# Patient Record
Sex: FEMALE | Race: BLACK OR AFRICAN AMERICAN | NOT HISPANIC OR LATINO | URBAN - METROPOLITAN AREA
[De-identification: names, ages, dates, MRNs, and addresses within clinical notes are randomized per-mention and may not be internally consistent; named-entity substitution may affect disease eponyms.]

---

## 2018-11-15 ENCOUNTER — EMERGENCY (EMERGENCY)
Facility: HOSPITAL | Age: 48
LOS: 1 days | Discharge: HOME | End: 2018-11-15
Attending: EMERGENCY MEDICINE | Admitting: EMERGENCY MEDICINE
Payer: OTHER MISCELLANEOUS

## 2018-11-15 VITALS
TEMPERATURE: 99 F | DIASTOLIC BLOOD PRESSURE: 88 MMHG | OXYGEN SATURATION: 98 % | RESPIRATION RATE: 17 BRPM | SYSTOLIC BLOOD PRESSURE: 155 MMHG | HEART RATE: 82 BPM

## 2018-11-15 PROCEDURE — 99284 EMERGENCY DEPT VISIT MOD MDM: CPT

## 2018-11-15 PROCEDURE — 72125 CT NECK SPINE W/O DYE: CPT | Mod: 26

## 2018-11-15 PROCEDURE — 70450 CT HEAD/BRAIN W/O DYE: CPT | Mod: 26

## 2018-11-15 RX ORDER — IBUPROFEN 200 MG
600 TABLET ORAL ONCE
Qty: 0 | Refills: 0 | Status: COMPLETED | OUTPATIENT
Start: 2018-11-15 | End: 2018-11-15

## 2018-11-15 RX ADMIN — Medication 600 MILLIGRAM(S): at 09:51

## 2018-11-15 NOTE — ED PROVIDER NOTE - CHPI ED SYMPTOMS NEG
no change in level of consciousness/no chest wall tenderness/no vomiting/no seizure/no weakness/no back pain/no loss of consciousness/no chest pain/no blurred vision

## 2018-11-15 NOTE — ED PROVIDER NOTE - SKIN, MLM
Skin normal color for race, warm, dry and intact. No evidence of rash. small abrasion to bridge of nose
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
## 2020-04-26 ENCOUNTER — MESSAGE (OUTPATIENT)
Age: 50
End: 2020-04-26

## 2020-05-04 LAB
SARS-COV-2 IGG SERPL IA-ACNC: <0.1 INDEX
SARS-COV-2 IGG SERPL QL IA: NEGATIVE

## 2020-05-08 ENCOUNTER — APPOINTMENT (OUTPATIENT)
Dept: DISASTER EMERGENCY | Facility: HOSPITAL | Age: 50
End: 2020-05-08

## 2021-12-09 PROBLEM — Z00.00 ENCOUNTER FOR PREVENTIVE HEALTH EXAMINATION: Status: ACTIVE | Noted: 2021-12-09

## 2021-12-28 ENCOUNTER — TRANSCRIPTION ENCOUNTER (OUTPATIENT)
Age: 51
End: 2021-12-28

## 2021-12-29 ENCOUNTER — APPOINTMENT (OUTPATIENT)
Dept: RADIOLOGY | Facility: IMAGING CENTER | Age: 51
End: 2021-12-29
Payer: COMMERCIAL

## 2021-12-29 ENCOUNTER — OUTPATIENT (OUTPATIENT)
Dept: OUTPATIENT SERVICES | Facility: HOSPITAL | Age: 51
LOS: 1 days | End: 2021-12-29
Payer: COMMERCIAL

## 2021-12-29 DIAGNOSIS — Z00.8 ENCOUNTER FOR OTHER GENERAL EXAMINATION: ICD-10-CM

## 2021-12-29 PROCEDURE — 77080 DXA BONE DENSITY AXIAL: CPT | Mod: 26

## 2021-12-29 PROCEDURE — 77080 DXA BONE DENSITY AXIAL: CPT

## 2022-12-06 ENCOUNTER — INPATIENT (INPATIENT)
Facility: HOSPITAL | Age: 52
LOS: 1 days | Discharge: HOME | End: 2022-12-08
Attending: INTERNAL MEDICINE | Admitting: INTERNAL MEDICINE

## 2022-12-06 VITALS
RESPIRATION RATE: 18 BRPM | SYSTOLIC BLOOD PRESSURE: 145 MMHG | DIASTOLIC BLOOD PRESSURE: 97 MMHG | OXYGEN SATURATION: 99 % | HEART RATE: 145 BPM | TEMPERATURE: 103 F

## 2022-12-06 DIAGNOSIS — J18.9 PNEUMONIA, UNSPECIFIED ORGANISM: ICD-10-CM

## 2022-12-06 DIAGNOSIS — R94.31 ABNORMAL ELECTROCARDIOGRAM [ECG] [EKG]: ICD-10-CM

## 2022-12-06 DIAGNOSIS — Z29.9 ENCOUNTER FOR PROPHYLACTIC MEASURES, UNSPECIFIED: ICD-10-CM

## 2022-12-06 DIAGNOSIS — Z90.710 ACQUIRED ABSENCE OF BOTH CERVIX AND UTERUS: Chronic | ICD-10-CM

## 2022-12-06 DIAGNOSIS — I10 ESSENTIAL (PRIMARY) HYPERTENSION: ICD-10-CM

## 2022-12-06 DIAGNOSIS — R91.1 SOLITARY PULMONARY NODULE: ICD-10-CM

## 2022-12-06 DIAGNOSIS — A41.9 SEPSIS, UNSPECIFIED ORGANISM: ICD-10-CM

## 2022-12-06 LAB
ALBUMIN SERPL ELPH-MCNC: 4.3 G/DL — SIGNIFICANT CHANGE UP (ref 3.3–5)
ALP SERPL-CCNC: 108 U/L — SIGNIFICANT CHANGE UP (ref 40–120)
ALT FLD-CCNC: 21 U/L — SIGNIFICANT CHANGE UP (ref 4–33)
ANION GAP SERPL CALC-SCNC: 13 MMOL/L — SIGNIFICANT CHANGE UP (ref 7–14)
APPEARANCE UR: CLEAR — SIGNIFICANT CHANGE UP
AST SERPL-CCNC: 19 U/L — SIGNIFICANT CHANGE UP (ref 4–32)
BACTERIA # UR AUTO: NEGATIVE — SIGNIFICANT CHANGE UP
BASE EXCESS BLDV CALC-SCNC: 0.9 MMOL/L — SIGNIFICANT CHANGE UP (ref -2–3)
BASOPHILS # BLD AUTO: 0 K/UL — SIGNIFICANT CHANGE UP (ref 0–0.2)
BASOPHILS NFR BLD AUTO: 0 % — SIGNIFICANT CHANGE UP (ref 0–2)
BILIRUB SERPL-MCNC: 0.6 MG/DL — SIGNIFICANT CHANGE UP (ref 0.2–1.2)
BILIRUB UR-MCNC: NEGATIVE — SIGNIFICANT CHANGE UP
BLOOD GAS VENOUS COMPREHENSIVE RESULT: SIGNIFICANT CHANGE UP
BUN SERPL-MCNC: 6 MG/DL — LOW (ref 7–23)
CALCIUM SERPL-MCNC: 9.8 MG/DL — SIGNIFICANT CHANGE UP (ref 8.4–10.5)
CHLORIDE BLDV-SCNC: 97 MMOL/L — SIGNIFICANT CHANGE UP (ref 96–108)
CHLORIDE SERPL-SCNC: 95 MMOL/L — LOW (ref 98–107)
CO2 BLDV-SCNC: 24.8 MMOL/L — SIGNIFICANT CHANGE UP (ref 22–26)
CO2 SERPL-SCNC: 22 MMOL/L — SIGNIFICANT CHANGE UP (ref 22–31)
COLOR SPEC: SIGNIFICANT CHANGE UP
CREAT SERPL-MCNC: 0.92 MG/DL — SIGNIFICANT CHANGE UP (ref 0.5–1.3)
D DIMER BLD IA.RAPID-MCNC: 333 NG/ML DDU — HIGH
DIFF PNL FLD: ABNORMAL
EGFR: 74 ML/MIN/1.73M2 — SIGNIFICANT CHANGE UP
EOSINOPHIL # BLD AUTO: 0 K/UL — SIGNIFICANT CHANGE UP (ref 0–0.5)
EOSINOPHIL NFR BLD AUTO: 0 % — SIGNIFICANT CHANGE UP (ref 0–6)
EPI CELLS # UR: 0 /HPF — SIGNIFICANT CHANGE UP (ref 0–5)
FLUAV AG NPH QL: SIGNIFICANT CHANGE UP
FLUBV AG NPH QL: SIGNIFICANT CHANGE UP
GAS PNL BLDV: 129 MMOL/L — LOW (ref 136–145)
GIANT PLATELETS BLD QL SMEAR: PRESENT — SIGNIFICANT CHANGE UP
GLUCOSE BLDV-MCNC: 135 MG/DL — HIGH (ref 70–99)
GLUCOSE SERPL-MCNC: 124 MG/DL — HIGH (ref 70–99)
GLUCOSE UR QL: NEGATIVE — SIGNIFICANT CHANGE UP
HCG SERPL-ACNC: <5 MIU/ML — SIGNIFICANT CHANGE UP
HCO3 BLDV-SCNC: 24 MMOL/L — SIGNIFICANT CHANGE UP (ref 22–29)
HCT VFR BLD CALC: 34.6 % — SIGNIFICANT CHANGE UP (ref 34.5–45)
HCT VFR BLDA CALC: 37 % — SIGNIFICANT CHANGE UP (ref 34.5–46.5)
HGB BLD CALC-MCNC: 12.4 G/DL — SIGNIFICANT CHANGE UP (ref 11.5–15.5)
HGB BLD-MCNC: 11.7 G/DL — SIGNIFICANT CHANGE UP (ref 11.5–15.5)
IANC: 16.02 K/UL — HIGH (ref 1.8–7.4)
KETONES UR-MCNC: ABNORMAL
LACTATE BLDV-MCNC: 1.4 MMOL/L — SIGNIFICANT CHANGE UP (ref 0.5–2)
LEUKOCYTE ESTERASE UR-ACNC: NEGATIVE — SIGNIFICANT CHANGE UP
LYMPHOCYTES # BLD AUTO: 11.2 % — LOW (ref 13–44)
LYMPHOCYTES # BLD AUTO: 2.22 K/UL — SIGNIFICANT CHANGE UP (ref 1–3.3)
MCHC RBC-ENTMCNC: 27.5 PG — SIGNIFICANT CHANGE UP (ref 27–34)
MCHC RBC-ENTMCNC: 33.8 GM/DL — SIGNIFICANT CHANGE UP (ref 32–36)
MCV RBC AUTO: 81.2 FL — SIGNIFICANT CHANGE UP (ref 80–100)
MONOCYTES # BLD AUTO: 1.71 K/UL — HIGH (ref 0–0.9)
MONOCYTES NFR BLD AUTO: 8.6 % — SIGNIFICANT CHANGE UP (ref 2–14)
NEUTROPHILS # BLD AUTO: 15.92 K/UL — HIGH (ref 1.8–7.4)
NEUTROPHILS NFR BLD AUTO: 80.2 % — HIGH (ref 43–77)
NITRITE UR-MCNC: NEGATIVE — SIGNIFICANT CHANGE UP
PCO2 BLDV: 32 MMHG — LOW (ref 39–42)
PH BLDV: 7.48 — HIGH (ref 7.32–7.43)
PH UR: 6.5 — SIGNIFICANT CHANGE UP (ref 5–8)
PLAT MORPH BLD: NORMAL — SIGNIFICANT CHANGE UP
PLATELET # BLD AUTO: 312 K/UL — SIGNIFICANT CHANGE UP (ref 150–400)
PLATELET COUNT - ESTIMATE: NORMAL — SIGNIFICANT CHANGE UP
PO2 BLDV: 47 MMHG — SIGNIFICANT CHANGE UP
POLYCHROMASIA BLD QL SMEAR: SIGNIFICANT CHANGE UP
POTASSIUM BLDV-SCNC: 3.8 MMOL/L — SIGNIFICANT CHANGE UP (ref 3.5–5.1)
POTASSIUM SERPL-MCNC: 3.8 MMOL/L — SIGNIFICANT CHANGE UP (ref 3.5–5.3)
POTASSIUM SERPL-SCNC: 3.8 MMOL/L — SIGNIFICANT CHANGE UP (ref 3.5–5.3)
PROT SERPL-MCNC: 7.9 G/DL — SIGNIFICANT CHANGE UP (ref 6–8.3)
PROT UR-MCNC: ABNORMAL
RBC # BLD: 4.26 M/UL — SIGNIFICANT CHANGE UP (ref 3.8–5.2)
RBC # FLD: 15.8 % — HIGH (ref 10.3–14.5)
RBC BLD AUTO: NORMAL — SIGNIFICANT CHANGE UP
RBC CASTS # UR COMP ASSIST: 1 /HPF — SIGNIFICANT CHANGE UP (ref 0–4)
RSV RNA NPH QL NAA+NON-PROBE: SIGNIFICANT CHANGE UP
SAO2 % BLDV: 81.8 % — SIGNIFICANT CHANGE UP
SARS-COV-2 RNA SPEC QL NAA+PROBE: SIGNIFICANT CHANGE UP
SODIUM SERPL-SCNC: 130 MMOL/L — LOW (ref 135–145)
SP GR SPEC: 1.01 — LOW (ref 1.01–1.05)
UROBILINOGEN FLD QL: SIGNIFICANT CHANGE UP
WBC # BLD: 19.85 K/UL — HIGH (ref 3.8–10.5)
WBC # FLD AUTO: 19.85 K/UL — HIGH (ref 3.8–10.5)
WBC UR QL: 1 /HPF — SIGNIFICANT CHANGE UP (ref 0–5)

## 2022-12-06 PROCEDURE — 93010 ELECTROCARDIOGRAM REPORT: CPT

## 2022-12-06 PROCEDURE — 74177 CT ABD & PELVIS W/CONTRAST: CPT | Mod: 26,ME

## 2022-12-06 PROCEDURE — 71275 CT ANGIOGRAPHY CHEST: CPT | Mod: 26,MG

## 2022-12-06 PROCEDURE — 99223 1ST HOSP IP/OBS HIGH 75: CPT | Mod: GC

## 2022-12-06 PROCEDURE — 71046 X-RAY EXAM CHEST 2 VIEWS: CPT | Mod: 26

## 2022-12-06 PROCEDURE — 99285 EMERGENCY DEPT VISIT HI MDM: CPT

## 2022-12-06 PROCEDURE — G1004: CPT

## 2022-12-06 RX ORDER — ACETAMINOPHEN 500 MG
650 TABLET ORAL EVERY 6 HOURS
Refills: 0 | Status: DISCONTINUED | OUTPATIENT
Start: 2022-12-06 | End: 2022-12-08

## 2022-12-06 RX ORDER — HYDROCHLOROTHIAZIDE 25 MG
1 TABLET ORAL
Qty: 0 | Refills: 0 | DISCHARGE

## 2022-12-06 RX ORDER — ONDANSETRON 8 MG/1
4 TABLET, FILM COATED ORAL ONCE
Refills: 0 | Status: COMPLETED | OUTPATIENT
Start: 2022-12-06 | End: 2022-12-06

## 2022-12-06 RX ORDER — PIPERACILLIN AND TAZOBACTAM 4; .5 G/20ML; G/20ML
3.38 INJECTION, POWDER, LYOPHILIZED, FOR SOLUTION INTRAVENOUS EVERY 8 HOURS
Refills: 0 | Status: DISCONTINUED | OUTPATIENT
Start: 2022-12-06 | End: 2022-12-07

## 2022-12-06 RX ORDER — ACETAMINOPHEN 500 MG
1000 TABLET ORAL ONCE
Refills: 0 | Status: COMPLETED | OUTPATIENT
Start: 2022-12-06 | End: 2022-12-06

## 2022-12-06 RX ORDER — ENOXAPARIN SODIUM 100 MG/ML
40 INJECTION SUBCUTANEOUS EVERY 24 HOURS
Refills: 0 | Status: DISCONTINUED | OUTPATIENT
Start: 2022-12-06 | End: 2022-12-08

## 2022-12-06 RX ORDER — AMLODIPINE BESYLATE 2.5 MG/1
1 TABLET ORAL
Qty: 0 | Refills: 0 | DISCHARGE

## 2022-12-06 RX ORDER — VANCOMYCIN HCL 1 G
1000 VIAL (EA) INTRAVENOUS EVERY 12 HOURS
Refills: 0 | Status: DISCONTINUED | OUTPATIENT
Start: 2022-12-06 | End: 2022-12-06

## 2022-12-06 RX ORDER — KETOROLAC TROMETHAMINE 30 MG/ML
15 SYRINGE (ML) INJECTION ONCE
Refills: 0 | Status: DISCONTINUED | OUTPATIENT
Start: 2022-12-06 | End: 2022-12-06

## 2022-12-06 RX ORDER — VANCOMYCIN HCL 1 G
1000 VIAL (EA) INTRAVENOUS ONCE
Refills: 0 | Status: COMPLETED | OUTPATIENT
Start: 2022-12-06 | End: 2022-12-06

## 2022-12-06 RX ORDER — SODIUM CHLORIDE 9 MG/ML
2000 INJECTION INTRAMUSCULAR; INTRAVENOUS; SUBCUTANEOUS ONCE
Refills: 0 | Status: COMPLETED | OUTPATIENT
Start: 2022-12-06 | End: 2022-12-06

## 2022-12-06 RX ORDER — DIAZEPAM 5 MG
5 TABLET ORAL ONCE
Refills: 0 | Status: DISCONTINUED | OUTPATIENT
Start: 2022-12-06 | End: 2022-12-06

## 2022-12-06 RX ORDER — MAGNESIUM SULFATE 500 MG/ML
1 VIAL (ML) INJECTION ONCE
Refills: 0 | Status: COMPLETED | OUTPATIENT
Start: 2022-12-06 | End: 2022-12-06

## 2022-12-06 RX ORDER — PIPERACILLIN AND TAZOBACTAM 4; .5 G/20ML; G/20ML
3.38 INJECTION, POWDER, LYOPHILIZED, FOR SOLUTION INTRAVENOUS ONCE
Refills: 0 | Status: COMPLETED | OUTPATIENT
Start: 2022-12-06 | End: 2022-12-06

## 2022-12-06 RX ORDER — ACETAMINOPHEN 500 MG
650 TABLET ORAL ONCE
Refills: 0 | Status: COMPLETED | OUTPATIENT
Start: 2022-12-06 | End: 2022-12-06

## 2022-12-06 RX ADMIN — Medication 400 MILLIGRAM(S): at 18:45

## 2022-12-06 RX ADMIN — Medication 100 GRAM(S): at 22:13

## 2022-12-06 RX ADMIN — SODIUM CHLORIDE 2000 MILLILITER(S): 9 INJECTION INTRAMUSCULAR; INTRAVENOUS; SUBCUTANEOUS at 12:05

## 2022-12-06 RX ADMIN — Medication 100 MILLIGRAM(S): at 22:24

## 2022-12-06 RX ADMIN — Medication 5 MILLIGRAM(S): at 11:56

## 2022-12-06 RX ADMIN — Medication 15 MILLIGRAM(S): at 18:47

## 2022-12-06 RX ADMIN — Medication 650 MILLIGRAM(S): at 11:56

## 2022-12-06 RX ADMIN — PIPERACILLIN AND TAZOBACTAM 25 GRAM(S): 4; .5 INJECTION, POWDER, LYOPHILIZED, FOR SOLUTION INTRAVENOUS at 22:07

## 2022-12-06 RX ADMIN — ONDANSETRON 4 MILLIGRAM(S): 8 TABLET, FILM COATED ORAL at 12:01

## 2022-12-06 RX ADMIN — Medication 250 MILLIGRAM(S): at 13:16

## 2022-12-06 RX ADMIN — PIPERACILLIN AND TAZOBACTAM 200 GRAM(S): 4; .5 INJECTION, POWDER, LYOPHILIZED, FOR SOLUTION INTRAVENOUS at 12:05

## 2022-12-06 NOTE — H&P ADULT - PROBLEM SELECTOR PLAN 6
DVT ppx: sq lovenox  Diet: regular lactose free  Dispo: admit to medicine for PNA - hold home amlodipine and HCTZ for now given septic, restart as tolerated

## 2022-12-06 NOTE — H&P ADULT - PROBLEM SELECTOR PLAN 1
- patient met sepsis criteria with elevated WBC and tachycardia, also febrile to 103F  - likely source is PNA as seen on CT  - s/p vanc and zosyn in ED  - s/p 2L IVF in ED  - follow up blood and urine cultures  - obtain sputum culture  - lactate normal at 1.4  - tylenol PRN for headache/chest pain/fever  - continue zosyn  - IVF with 100cc/hr NS for 10 hr - patient met sepsis criteria with elevated WBC and tachycardia, also febrile to 103F  - likely source is PNA as seen on CT  - s/p vanc and zosyn in ED  - s/p 2L IVF in ED  - follow up blood and urine cultures  - obtain sputum culture  - MRSA PCR  - lactate normal at 1.4  - tylenol PRN for headache/chest pain/fever  - check urine legionella  - continue zosyn, Add doxycycline to cover for atypicals and MRSA (can't use azithromycin given prolonged QTC 535ms) WBC= 20K, Febrile, Igcc=868; Tachycardia, PB=327g-298n;   Due to RUL PNA as seen on CT   - s/p 2L IVF in ED  - follow up blood and urine cultures  - MRSA PCR  - lactate normal at 1.4  - tylenol PRN for headache/chest pain/fever  - Abx as below  - VS Q4H

## 2022-12-06 NOTE — MEDICAL STUDENT ADULT H&P (EDUCATION) - NS MD HP STUD HX OF PRESENT ILLNESS FT
This is a 53 y/o F with a PMH of HTN presenting with a 1 day history of chest pain and a 1 week history of F/Cs on and off. She describes having recent covid exposure and then starting to feel sick after. She has had some difficulty eating following this with some accompanying nausea. The describes the pain as sharp and at times worse when lying down or taking deep breaths. The pain is under her R breast. She mentions passing gas makes it better. She also has a headache for the past day which is not normal for her. She denies any recent skin contacts, contacts with animals, and travel. She denies weight loss/gain and nightsweats.  This is a 51 y/o F with a PMH of HTN presenting with a 1 day history of chest pain and a 1 week history of F/Cs on and off. She describes having recent covid exposure and then starting to feel sick after. She has had some difficulty eating following this with some accompanying nausea. The describes the pain as sharp and at times worse when lying down or taking deep breaths. The pain is under her R breast. She mentions passing gas makes it better. She also has a headache for the past day which is not normal for her. She denies any recent sick contacts, contacts with animals, and travel. She denies weight loss/gain and nightsweats.

## 2022-12-06 NOTE — MEDICAL STUDENT ADULT H&P (EDUCATION) - NS MD HP STUD ASPLAN ASSES FT
This is a 51 y/o F with a PMH of HTN presenting with a 1 day history of chest pain and a 1 week history of F/Cs on and off. She is febrile but other VSS. CTA was notable for RUL opacity as well as RML nodules. Her laboratory results were significant for decreased electrolytes likely 2/2 to dehydration in the setting of reported decreased PO. Her CBC was notable for leukocytosis. Pt is clinically stable and will be admitted for further observation. F/u with pulm recommended.

## 2022-12-06 NOTE — H&P ADULT - MUSCULOSKELETAL
strength 5/5 bilateral upper extremities/strength 5/5 bilateral lower extremities/decreased strength

## 2022-12-06 NOTE — ED ADULT NURSE NOTE - NSIMPLEMENTINTERV_GEN_ALL_ED
Implemented All Universal Safety Interventions:  [place] to call system. Call bell, personal items and telephone within reach. Instruct patient to call for assistance. Room bathroom lighting operational. Non-slip footwear when patient is off stretcher. Physically safe environment: no spills, clutter or unnecessary equipment. Stretcher in lowest position, wheels locked, appropriate side rails in place.

## 2022-12-06 NOTE — H&P ADULT - PROBLEM SELECTOR PLAN 5
DVT ppx: sq lovenox  Diet: regular lactose free  Dispo: admit to medicine for PNA - hold home amlodipine and HCTZ for now given septic, restart as tolerated CT A/P: Asymmetric sclerosis of the left iliac bone of uncertain etiology. This may represent sequelae of early pagetoid changes, or sequelae of fibrous dysplasia, though nonspecific.  -outpt MRI and f/u with PCP (to be discussed by the primary team prior to d/c)

## 2022-12-06 NOTE — ED PROVIDER NOTE - PHYSICAL EXAMINATION
Gen: significant distress, AOx3, able to make needs known  Head: NCAT  HEENT: EOMI, normal conjunctiva  Lung: CTAB, no respiratory distress, saturating 98% RA, no wheezes/rhonchi/rales B/L, speaking in full sentences  CV: tachycardic, regular rhythm, no M/R/G, pulses bilaterally   Abd: soft, NTND, no guarding  MSK: no visible bony deformities, +back spasms   Neuro: No focal sensory or motor deficits  Skin: Warm, well perfused, no rash  Psych: normal affect

## 2022-12-06 NOTE — PATIENT PROFILE ADULT - FUNCTIONAL ASSESSMENT - DAILY ACTIVITY 3.
Patient is alert and orientedX4. In no acute distress. denies chest pain , sob or palpitations.
4 = No assist / stand by assistance

## 2022-12-06 NOTE — ED ADULT NURSE NOTE - OBJECTIVE STATEMENT
53 y/o female presenting to room 24. Patient AAOx4, ambulatory at baseline. Patient coming to ER c/o of chest pain and abdominal pain. PMHx: HTN. Patient states that she has been experiencing fevers and feeling weak and lethargic since last Monday. Patient denies dizziness and SOB. Noted to be febrile and tachycardic on assessment. Breathing even and unlabored. No pallor or diaphoresis noted at this time. Patient states she took excedrine for headache and no tylenol or ibuprofen for fever. #18g placed to R wrist, #20g placed to L wrist. Labs drawn and sent as per MD order. Patient noted to be ST on cardiac monitor. Medications given as per MD order. Awaiting x-ray and CT.

## 2022-12-06 NOTE — MEDICAL STUDENT ADULT H&P (EDUCATION) - NS MD HP STUD PE VITALS FT
Vital Signs Last 24 Hrs  T(C): 37.3 (06 Dec 2022 16:21), Max: 39.4 (06 Dec 2022 10:47)  T(F): 99.1 (06 Dec 2022 16:21), Max: 103 (06 Dec 2022 10:47)  HR: 94 (06 Dec 2022 16:21) (71 - 145)  BP: 120/68 (06 Dec 2022 16:21) (120/68 - 145/97)  BP(mean): --  RR: 17 (06 Dec 2022 16:21) (17 - 18)  SpO2: 100% (06 Dec 2022 16:21) (99% - 100%)    Parameters below as of 06 Dec 2022 16:21  Patient On (Oxygen Delivery Method): room air

## 2022-12-06 NOTE — H&P ADULT - ASSESSMENT
51 y/o F with a PMH of HTN presenting with a 1 day history of chest pain and a 1 week history of F/Cs on and off.  Admitted for sepsis in setting of PNA.  53 y/o female with a MHx of HTN a/w sepsis due to RUL PNA, c/f HAP; Found ot have QT prolongation; Also found to have pulmonary nodule;

## 2022-12-06 NOTE — H&P ADULT - NSICDXFAMILYHX_GEN_ALL_CORE_FT
FAMILY HISTORY:  Mother  Still living? Unknown  FH: heart disease, Age at diagnosis: Age Unknown  FH: HTN (hypertension), Age at diagnosis: Age Unknown

## 2022-12-06 NOTE — MEDICAL STUDENT ADULT H&P (EDUCATION) - NS MD HP STUD RESULTS LAB FT
11.7   19.85 )-----------( 312      ( 06 Dec 2022 11:45 )             34.6   12-06    130<L>  |  95<L>  |  6<L>  ----------------------------<  124<H>  3.8   |  22  |  0.92    Ca    9.8      06 Dec 2022 11:45    TPro  7.9  /  Alb  4.3  /  TBili  0.6  /  DBili  x   /  AST  19  /  ALT  21  /  AlkPhos  108  12-06    UA:  trace ketones  protein 30  small blood  no bacteria  no nitrites or leukocyte esterase

## 2022-12-06 NOTE — ED PROVIDER NOTE - CARE PLAN
Principal Discharge DX:	Fever  Secondary Diagnosis:	Myalgia  Secondary Diagnosis:	Abdominal cramps   1 Principal Discharge DX:	Right upper lobe pneumonia  Secondary Diagnosis:	Myalgia  Secondary Diagnosis:	Abdominal cramps

## 2022-12-06 NOTE — MEDICAL STUDENT ADULT H&P (EDUCATION) - NS MD HP STUD ROS GENERAL FT
Pt called and VM left informing patient of letters being left at the front. Pt prompted to call clinic with questions or concerns.   
Pt states that he was told that he needs a letter sent to his job with that states that he has a Negative covid testing and can return back to work as of today or on tomorrow. Pt states that he does not have any symptoms and can be reached at wants 2 copies of the letter printed out and left at the . Pt wants a call back as soon as the letters are ready to be picked up at     CELL: [phone]       
All negative unless otherwise stated in HP

## 2022-12-06 NOTE — H&P ADULT - PROBLEM SELECTOR PLAN 3
- CT shows "Focal rounded 5.2 x 3.4 x 3.3 cm right upper lobe opacities appreciated. There are some air bronchograms. This likely represents focal area of pneumonia, though given its rounded configuration, follow-up to radiographic resolution is advised. Additional 6 mm right middle lobe pulmonary nodule is noted"  - obtain pulm consult in AM for possible bronchoscopy CTA chest: 6 mm right middle lobe pulmonary nodule is noted.  - f/u with PCP regarding repeat CT chest as outpt (primary team to discuss with the pt prior to d/c)

## 2022-12-06 NOTE — ED ADULT NURSE REASSESSMENT NOTE - NS ED NURSE REASSESS COMMENT FT1
Pt requesting to leave the hospital states she feels much better, explained the risks of leaving w/ active pneumonia, MD aware, will speak to patient regarding risks and benefits, will continue to monitor @ this time
Patient at baseline mental status. Breathing even and unlabored. Blood pressure, heart rate and temperature improved at this time. Breathing even and unlabored. MD Gross aware of patient status. Awaiting orders and xray.
Pt at baseline mental status. Denies chest pain, headache and dizziness. Breathing even and unlabored. No pallor or diaphoresis noted on assessment. Pt awaiting Ct. x-ray shows right upper lobe pneumonia.
Patient at baseline mental status. Denies chest pain, headache and dizziness. Breathing even and unlabored. No pallor or diaphoresis noted at this time.

## 2022-12-06 NOTE — H&P ADULT - NSHPPHYSICALEXAM_GEN_ALL_CORE
Vital Signs Last 24 Hrs  T(C): 37.3 (06 Dec 2022 16:21), Max: 39.4 (06 Dec 2022 10:47)  T(F): 99.1 (06 Dec 2022 16:21), Max: 103 (06 Dec 2022 10:47)  HR: 94 (06 Dec 2022 16:21) (71 - 145)  BP: 120/68 (06 Dec 2022 16:21) (120/68 - 145/97)  BP(mean): --  RR: 17 (06 Dec 2022 16:21) (17 - 18)  SpO2: 100% (06 Dec 2022 16:21) (99% - 100%)    Parameters below as of 06 Dec 2022 16:21  Patient On (Oxygen Delivery Method): room air      CAPILLARY BLOOD GLUCOSE        I&O's Summary      PHYSICAL EXAM:********************  GENERAL: NAD, well-developed  HEAD:  Atraumatic, Normocephalic  EYES: EOMI, PERRLA, conjunctiva and sclera clear  NECK: Supple, No JVD  CHEST/LUNG: Clear to auscultation bilaterally; No wheeze  HEART: Regular rate and rhythm; No murmurs, rubs, or gallops  ABDOMEN: Soft, Nontender, Nondistended; Bowel sounds present  EXTREMITIES:  2+ Peripheral Pulses, No clubbing, cyanosis, or edema  PSYCH: AAOx3  NEUROLOGY: non-focal  SKIN: No rashes or lesions Vital Signs Last 24 Hrs  T(C): 37.3 (06 Dec 2022 16:21), Max: 39.4 (06 Dec 2022 10:47)  T(F): 99.1 (06 Dec 2022 16:21), Max: 103 (06 Dec 2022 10:47)  HR: 94 (06 Dec 2022 16:21) (71 - 145)  BP: 120/68 (06 Dec 2022 16:21) (120/68 - 145/97)  BP(mean): --  RR: 17 (06 Dec 2022 16:21) (17 - 18)  SpO2: 100% (06 Dec 2022 16:21) (99% - 100%)    Parameters below as of 06 Dec 2022 16:21  Patient On (Oxygen Delivery Method): room air      CAPILLARY BLOOD GLUCOSE        I&O's Summary      PHYSICAL EXAM:  GENERAL: appears fatigued, slightly diaphoretic sitting in ED bed in hallway  HEAD:  Atraumatic, Normocephalic  EYES: EOMI, PERRLA, anicteric sclera  NECK: Supple, No JVD  CHEST/LUNG: Clear to auscultation bilaterally; No wheeze  HEART: Normal rate and regular rhythm; No murmurs, rubs, or gallops  ABDOMEN: Soft, Nontender, Nondistended; Bowel sounds present  EXTREMITIES:  2+ Peripheral Pulses, No clubbing, cyanosis, or edema  PSYCH: AAOx3  NEUROLOGY: non-focal  SKIN: No rashes or lesions Vital Signs Last 24 Hrs  T(C): 37.3 (06 Dec 2022 16:21), Max: 39.4 (06 Dec 2022 10:47)  T(F): 99.1 (06 Dec 2022 16:21), Max: 103 (06 Dec 2022 10:47)  HR: 94 (06 Dec 2022 16:21) (71 - 145)  BP: 120/68 (06 Dec 2022 16:21) (120/68 - 145/97)  BP(mean): --  RR: 17 (06 Dec 2022 16:21) (17 - 18)  SpO2: 100% (06 Dec 2022 16:21) (99% - 100%)    Parameters below as of 06 Dec 2022 16:21  Patient On (Oxygen Delivery Method): room air

## 2022-12-06 NOTE — MEDICAL STUDENT ADULT H&P (EDUCATION) - NS MD HP STUD PE GI FT
Soft, NT, ND, no rebound, no guarding; no palpable masses; no hepatosplenomegaly; no hernia palpated

## 2022-12-06 NOTE — ED ADULT TRIAGE NOTE - CHIEF COMPLAINT QUOTE
Last week Monday went out of work for a week with same symptoms and came back yesterday because she felt better. Pt has c/o headache, gas, spasms in back, feet and hand, decreased appetite, and pain under right chest

## 2022-12-06 NOTE — H&P ADULT - ATTENDING COMMENTS
51 y/o female with a MHx of HTN a/w sepsis due to RUL PNA, c/f HAP; Found ot have QT prolongation; Also found to have pulmonary nodule;     Assessment and plan supplemented and modified where indicated;

## 2022-12-06 NOTE — PATIENT PROFILE ADULT - NSPROEXTENSIONSOFSELF_GEN_A_NUR
cellphone, clothing, 3 earrings with white stone, 2 yellow toe ring.  one beaded ankle bracelet./none

## 2022-12-06 NOTE — ED PROVIDER NOTE - NS ED ROS FT
GENERAL: No fever, no chills  EYES: No change in vision  HEENT: No trouble swallowing or speaking  CARDIAC: +chest pain  PULMONARY: No cough, no SOB  GI: No abdominal pain, +nausea, no vomiting, no diarrhea, no constipation  : No changes in urination  SKIN: No rashes  NEURO: +headache, no numbness  MSK: No joint pain  Otherwise as HPI or negative.

## 2022-12-06 NOTE — ED PROVIDER NOTE - ATTENDING CONTRIBUTION TO CARE
DR. BANGURA, ATTENDING MD-  I performed a face to face bedside interview with the patient regarding history of present illness, review of symptoms and past medical history. I completed an independent physical exam.  I have discussed the patient's plan of care with the resident.   Documentation as above in the note.    53 y/o female with fever ha nausea back pain muscle spasms dec appetite abd cramps similar to gas pain for past few days.  Tachy to 140's, febrile to 103.  Concern for sepsis, unknown etiology.  Obtain cbc cmp ekg cxr ct a/p ua ucx viral swab vbg blood cx x2 give ivf bolus broad spec abx antiemetic antipyretic reassess likely admission for further care and evaluation.

## 2022-12-06 NOTE — H&P ADULT - PROBLEM SELECTOR PLAN 2
- possibly in setting of obstruction vs CAP vs HAP given patient working in hospital environment  - sputum cultures as above  - continue zosyn for now - possibly in setting of obstruction vs CAP vs HAP given patient working in hospital environment  - sputum cultures as above  - continue antibiotics as above - possibly in setting of obstruction vs CAP vs HAP given patient working in hospital environment  - sputum cultures as above  - continue antibiotics as above  - ID paged regarding doxycycline for MRSA coverage CTA chest: Focal rounded 5.2 x 3.4 x 3.3 cm right upper lobe opacities appreciated. There are some air bronchograms. 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 Otherwise heathy; Concerned for HAP including MRSA given work-related exposure/ hospital environment;  - sputum cultures as above  - Zosyn IV, Vancomycin IV, Doxycycline IV   - Vancomycin through prior to 4th dose   - Formal ID c/s in AM regarding Abx coverage given c/f HAP and QT prolongation  - Urine legionella Ag ordered

## 2022-12-06 NOTE — H&P ADULT - NSHPSOCIALHISTORY_GEN_ALL_CORE
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 Smokes marijuana almost every day, less frequently over past couple days. Drinks <1 glass of wine after work, not every day. 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  Lives at home with  and dog.  Smokes marijuana almost every day, less frequently over past couple days.  Drinks <1 glass of wine after work, not every day.

## 2022-12-06 NOTE — H&P ADULT - HISTORY OF PRESENT ILLNESS
This is a 51 y/o F with a PMH of HTN presenting with a 1 day history of chest pain and a 1 week history of F/Cs on and off. She describes having recent covid exposure and then starting to feel sick after. She has had some difficulty eating following this with some accompanying nausea. The describes the pain as sharp and at times worse when lying down or taking deep breaths. The pain is under her R breast. She mentions passing gas makes it better. She also has a headache for the past day which is not normal for her. She denies any recent sick contacts, contacts with animals, and travel. She denies weight loss/gain and nightsweats.  53 y/o female with a MHx of HTN presenting with a 1 day history of chest pain and a 1 week history of F/Cs on and off. She describes having recent covid exposure and then starting to feel sick after. She has had some difficulty eating following this with some accompanying nausea. The describes the pain as sharp and at times; The pain is worse when lying down or taking deep breaths. The pain is under her R breast. She mentions passing gas makes it better. She also has a headache for the past day which is not normal for her.  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 She denies weight loss/gain and nightsweats.

## 2022-12-06 NOTE — MEDICAL STUDENT ADULT H&P (EDUCATION) - NS MD HP STUD ASPLAN PLAN FT
Problem 1: Sepsis  - Likely secondary to an infectious process given leukocytosis and fever  - C/w zosyn  - IV NS, she received 2L NS in the ED thus far  - IV tylenol/motrin for pain control and fever    Problem 2: Lung nodule  - RUL opacity and RML nodules  - CTM and consult pulm tomorrow morning for further eval, potential bronchoscopy    Problem 4: HTN  - CTM BP  - Hold amlodipine and HCTZ because sepsis risk, will start if HTN    Problem 5: Prophylactic measures  - DVT PPx: Lovenox   - Regular diet (lactose intolerant)  - Dispo: admit to medicine, consult pulm in AM for further eval of CTA Chest findings Problem 1: Sepsis  - Likely secondary to an infectious process given leukocytosis and fever  - C/w zosyn  - IV NS, she received 2L NS in the ED thus far  - IV tylenol/motrin for pain control and fever    Problem 2: Lung nodule  - RUL opacity and RML nodules  - CTM and consult pulm tomorrow morning for further eval, potential bronchoscopy    Problem 3: HTN  - CTM BP  - Hold amlodipine and HCTZ because sepsis risk, will start if HTN    Problem 4: Sclerosis L iliac crest  - CT Abd/pelvis incidentally found sclerosis of L iliac crest  - F/u with PCP outpt    Problem 5: Prophylactic measures  - DVT PPx: Lovenox   - Regular diet (lactose intolerant)  - Dispo: admit to medicine, consult pulm in AM for further eval of CTA Chest findings

## 2022-12-06 NOTE — ED PROVIDER NOTE - PROGRESS NOTE DETAILS
Renato PGY2 - Hospitalist Earl was consulted for admission for right upper lobe pneumonia, accepting patient

## 2022-12-06 NOTE — H&P ADULT - NSHPLABSRESULTS_GEN_ALL_CORE
LABS:                        11.7   19.85 )-----------( 312      ( 06 Dec 2022 11:45 )             34.6     12-    130<L>  |  95<L>  |  6<L>  ----------------------------<  124<H>  3.8   |  22  |  0.92    Ca    9.8      06 Dec 2022 11:45    TPro  7.9  /  Alb  4.3  /  TBili  0.6  /  DBili  x   /  AST  19  /  ALT  21  /  AlkPhos  108  12-          Urinalysis Basic - ( 06 Dec 2022 12:20 )    Color: Light Yellow / Appearance: Clear / S.006 / pH: x  Gluc: x / Ketone: Trace  / Bili: Negative / Urobili: <2 mg/dL   Blood: x / Protein: 30 mg/dL / Nitrite: Negative   Leuk Esterase: Negative / RBC: 1 /HPF / WBC 1 /HPF   Sq Epi: x / Non Sq Epi: 0 /HPF / Bacteria: Negative        RADIOLOGY & ADDITIONAL TESTS:    Imaging Personally Reviewed:    Consultant(s) Notes Reviewed:      Care Discussed with Consultants/Other Providers: LABS:                        11.7   19.85 )-----------( 312      ( 06 Dec 2022 11:45 )             34.6     12-    130<L>  |  95<L>  |  6<L>  ----------------------------<  124<H>  3.8   |  22  |  0.92    Ca    9.8      06 Dec 2022 11:45    TPro  7.9  /  Alb  4.3  /  TBili  0.6  /  DBili  x   /  AST  19  /  ALT  21  /  AlkPhos  108  12-          Urinalysis Basic - ( 06 Dec 2022 12:20 )    Color: Light Yellow / Appearance: Clear / S.006 / pH: x  Gluc: x / Ketone: Trace  / Bili: Negative / Urobili: <2 mg/dL   Blood: x / Protein: 30 mg/dL / Nitrite: Negative   Leuk Esterase: Negative / RBC: 1 /HPF / WBC 1 /HPF   Sq Epi: x / Non Sq Epi: 0 /HPF / Bacteria: Negative        RADIOLOGY & ADDITIONAL TESTS:    Imaging Personally Reviewed:  FINDINGS:  CHEST:  LUNGS AND LARGE AIRWAYS: The central tracheobronchial tree is patent.   Focal rounded 5.2 x 3.4 x 3.3 cm right upper lobe opacities appreciated.   There are some air bronchograms. This likely represents focal area of   pneumonia, though given its rounded configuration, follow-up to   radiographic resolution is advised. Additional 6 mm right middle lobe   pulmonary nodule is noted. Single 6-8mm nodule in a low or high risk   patient should be followed with CT at 6-12 months, then consider CT at   18-24 months. -- ?Reference: Guidelines for Management of Incidental   Pulmonary Nodules Detected on CT Images: From the Fleischner Society   2017? Dependent changes are seen posteriorly. There is mild mosaic   attenuation at the lung bases, likely related to air-trapping or small   airways disease. Calcified left lower lobe granulomas noted.  PLEURA: No pleural effusion.  VESSELS: No pulmonary embolus within the main, lobar, segmental, or   subsegmental pulmonary arteries.  HEART: Heart size is normal. No pericardial effusion.  MEDIASTINUM AND DOMINICK: No lymphadenopathy.  CHEST WALL AND LOWER NECK: Within normal limits.    ABDOMEN AND PELVIS:  LIVER: Segment 5 hypodensity too small to characterize.  BILE DUCTS: Normal caliber.  GALLBLADDER: Within normal limits.  SPLEEN: Within normal limits.  PANCREAS: Within normal limits.  ADRENALS: Within normal limits.  KIDNEYS/URETERS: Symmetric enhancement. No hydronephrosis.    BLADDER: Within normal limits.  REPRODUCTIVE ORGANS: Hysterectomy. No adnexal masses.    BOWEL: Small hiatal hernia. No bowel obstruction. Appendix is normal.  PERITONEUM: No ascites.  VESSELS: Within normal limits.  RETROPERITONEUM/LYMPH NODES: Nonenlarged retroperitoneal lymph nodes are   noted.  ABDOMINAL WALL: Small fat-containing umbilical hernia.  BONES: Degenerative changes. Asymmetric sclerosis and cortical thickening   of the left iliac wing..    IMPRESSION:    Rounded right upper lobe opacity, whose appearance is suggestive of focal   pneumonia. However given its rounded morphology, follow-up to   radiographic resolution is advised to exclude an underlying mass.    No pulmonary embolism.    No acute intra-abdominal or pelvic pathology.    Asymmetric sclerosis of the left iliac bone of uncertain etiology. This   may represent sequelae of early pagetoid changes, or sequelae of fibrous   dysplasia, though nonspecific. Follow-up MRI be obtained as deemed   clinically indicated.    Additional findings and recommendations as above.    Consultant(s) Notes Reviewed:      Care Discussed with Consultants/Other Providers: .    -personally interpreted EKG: Sinus tach 139bpm, QTc 535, no acute Tw or ST changes, no PACs or PVCs    -personally interpreted CXR: RUL PNA, no pleural effusions     -personally interpreted labs:                         11.7   19.85 )-----------( 312      ( 06 Dec 2022 11:45 )             34.6     12-    130<L>  |  95<L>  |  6<L>  ----------------------------<  124<H>  3.8   |  22  |  0.92    Ca    9.8      06 Dec 2022 11:45    TPro  7.9  /  Alb  4.3  /  TBili  0.6  /  DBili  x   /  AST  19  /  ALT  21  /  AlkPhos  108  12-    Urinalysis Basic - ( 06 Dec 2022 12:20 )  Color: Light Yellow / Appearance: Clear / S.006 / pH: x  Gluc: x / Ketone: Trace  / Bili: Negative / Urobili: <2 mg/dL   Blood: x / Protein: 30 mg/dL / Nitrite: Negative   Leuk Esterase: Negative / RBC: 1 /HPF / WBC 1 /HPF   Sq Epi: x / Non Sq Epi: 0 /HPF / Bacteria: Negative      -personally reviewed: CT ABDOMEN AND PELVIS IC CT ANGIO CHEST PULM ART [facility]    PROCEDURE DATE: 2022    FINDINGS:  CHEST:  LUNGS AND LARGE AIRWAYS: The central tracheobronchial tree is patent.   Focal rounded 5.2 x 3.4 x 3.3 cm right upper lobe opacities appreciated.   There are some air bronchograms. This likely represents focal area of   pneumonia, though given its rounded configuration, follow-up to   radiographic resolution is advised. Additional 6 mm right middle lobe   pulmonary nodule is noted. Single 6-8mm nodule in a low or high risk   patient should be followed with CT at 6-12 months, then consider CT at   18-24 months. -- ?Reference: Guidelines for Management of Incidental   Pulmonary Nodules Detected on CT Images: From the Fleischner Society   2017? Dependent changes are seen posteriorly. There is mild mosaic   attenuation at the lung bases, likely related to air-trapping or small   airways disease. Calcified left lower lobe granulomas noted.  PLEURA: No pleural effusion.  VESSELS: No pulmonary embolus within the main, lobar, segmental, or   subsegmental pulmonary arteries.  HEART: Heart size is normal. No pericardial effusion.  MEDIASTINUM AND DOMINICK: No lymphadenopathy.  CHEST WALL AND LOWER NECK: Within normal limits.    ABDOMEN AND PELVIS:  LIVER: Segment 5 hypodensity too small to characterize.  BILE DUCTS: Normal caliber.  GALLBLADDER: Within normal limits.  SPLEEN: Within normal limits.  PANCREAS: Within normal limits.  ADRENALS: Within normal limits.  KIDNEYS/URETERS: Symmetric enhancement. No hydronephrosis.    BLADDER: Within normal limits.  REPRODUCTIVE ORGANS: Hysterectomy. No adnexal masses.    BOWEL: Small hiatal hernia. No bowel obstruction. Appendix is normal.  PERITONEUM: No ascites.  VESSELS: Within normal limits.  RETROPERITONEUM/LYMPH NODES: Nonenlarged retroperitoneal lymph nodes are   noted.  ABDOMINAL WALL: Small fat-containing umbilical hernia.  BONES: Degenerative changes. Asymmetric sclerosis and cortical thickening   of the left iliac wing..    IMPRESSION:  Rounded right upper lobe opacity, whose appearance is suggestive of focal   pneumonia. However given its rounded morphology, follow-up to   radiographic resolution is advised to exclude an underlying mass.  No pulmonary embolism.  No acute intra-abdominal or pelvic pathology.  Asymmetric sclerosis of the left iliac bone of uncertain etiology. This   may represent sequelae of early pagetoid changes, or sequelae of fibrous   dysplasia, though nonspecific. Follow-up MRI be obtained as deemed   clinically indicated.

## 2022-12-06 NOTE — ED PROVIDER NOTE - CLINICAL SUMMARY MEDICAL DECISION MAKING FREE TEXT BOX
Renato, PGY2 - 53yo woman with PMH HTN presenting with headache, chest pain, back spasms, decreased appetite with nausea, fevers, and tachycardic to the 130s. High concern for sepsis, although unclear etiology at this time considering clear lungs on exam, no abdominal tenderness, no urinary complaints. Labs, cultures, urine, chest x-ray, CT, reassess. EKG shows sinus tachycardia. D-dimer sent due to pleuritic chest pain and tachycardia, however low suspicion for PE considering no leg swelling, no hemoptysis, no recent travel. *The above represents an initial assessment/impression. Please refer to progress notes for potential changes in patient clinical course*

## 2022-12-06 NOTE — MEDICAL STUDENT ADULT H&P (EDUCATION) - NS MD HP STUD SOCIAL HX FT
Smokes marijuana almost daily  No other recreational drug use  No tobacco or nicotine vaporizer use  Social alcohol use

## 2022-12-06 NOTE — PATIENT PROFILE ADULT - FALL HARM RISK - UNIVERSAL INTERVENTIONS
Bed in lowest position, wheels locked, appropriate side rails in place/Call bell, personal items and telephone in reach/Instruct patient to call for assistance before getting out of bed or chair/Non-slip footwear when patient is out of bed/[employer] to call system/Physically safe environment - no spills, clutter or unnecessary equipment/Purposeful Proactive Rounding/Room/bathroom lighting operational, light cord in reach

## 2022-12-06 NOTE — ED PROVIDER NOTE - OBJECTIVE STATEMENT
51yo woman with PMH HTN, on Lasix, presenting with back spasms, headache, right-sided pleuritic chest pain, and decreased appetite that started last night.  Patient states that she had been feeling ill starting last Monday, felt better later in the week and returned to work yesterday. +fevers, +nausea, has not taken any medication for fever or pain. Denies vomiting, abdominal pain, hemoptysis, leg swelling.

## 2022-12-06 NOTE — H&P ADULT - PROBLEM SELECTOR PLAN 4
- hold home amlodipine and HCTZ for now given septic, restart as tolerated - QTC prolonged to 535ms at time of admission  - repeat EKG  - avoid qtc prolonging medications including zofran, reglan, azithromycin - QTC prolonged to 535ms at time of admission  - repeat EKG in AM (ordered)  - check Magnesium   - avoid QT prolonging agents - QTc prolonged to 535ms at time of admission  - repeat EKG in AM (ordered)  - check Magnesium   - avoid QT prolonging agents

## 2022-12-06 NOTE — H&P ADULT - NSHPREVIEWOFSYSTEMS_GEN_ALL_CORE
CONSTITUTIONAL:  No weight loss, fever, chills, weakness or fatigue.  HEENT:  Eyes:  No visual loss, blurred vision, double vision or yellow sclerae. Ears, Nose, Throat:  No hearing loss, sneezing, congestion, runny nose or sore throat.  SKIN:  No rash or itching.  CARDIOVASCULAR:  No chest pain, chest pressure or chest discomfort. No palpitations or edema.  RESPIRATORY:  No shortness of breath, cough or sputum.  GASTROINTESTINAL:  No anorexia, nausea, vomiting or diarrhea. No abdominal pain or blood.  GENITOURINARY: No dysuria, hematuria, or increased urinary frequency.  NEUROLOGICAL:  No headache, dizziness, syncope, paralysis, ataxia, numbness or tingling in the extremities. No change in bowel or bladder control.  MUSCULOSKELETAL:  No muscle, back pain, joint pain or stiffness.  HEMATOLOGIC:  No anemia, bleeding or bruising.  LYMPHATICS:  No enlarged nodes. No history of splenectomy.  PSYCHIATRIC:  No history of depression or anxiety.  ENDOCRINOLOGIC:  No reports of sweating, cold or heat intolerance. No polyuria or polydipsia.  ALLERGIES:  No history of asthma, hives, eczema or rhinitis. CONSTITUTIONAL:  No weight loss. + fever, weakness, fatigue.  HEENT:  Eyes:  No visual loss, blurred vision, double vision or yellow sclerae. Ears, Nose, Throat:  No hearing loss, sneezing, congestion, runny nose or sore throat.  SKIN:  No rash or itching.  CARDIOVASCULAR:  Right sided chest pain. No palpitations or edema.  RESPIRATORY:  +shortness of breath, +cough without sputum.  GASTROINTESTINAL:  No anorexia, nausea, vomiting or diarrhea. No abdominal pain or blood.  GENITOURINARY: No dysuria, hematuria, or increased urinary frequency.  NEUROLOGICAL:  + headache frontal and sides of head. No dizziness, syncope, paralysis, ataxia, numbness or tingling in the extremities. No change in bowel or bladder control.  MUSCULOSKELETAL:  No muscle, back pain, joint pain or stiffness.  HEMATOLOGIC:  No anemia, bleeding or bruising.  LYMPHATICS:  No enlarged nodes. No history of splenectomy.  PSYCHIATRIC:  No history of depression or anxiety.  ENDOCRINOLOGIC:  No reports of sweating, cold or heat intolerance. No polyuria or polydipsia.  ALLERGIES:  No history of asthma, hives, eczema or rhinitis.

## 2022-12-07 DIAGNOSIS — M89.9 DISORDER OF BONE, UNSPECIFIED: ICD-10-CM

## 2022-12-07 LAB
ALBUMIN SERPL ELPH-MCNC: 3.6 G/DL — SIGNIFICANT CHANGE UP (ref 3.3–5)
ALP SERPL-CCNC: 101 U/L — SIGNIFICANT CHANGE UP (ref 40–120)
ALT FLD-CCNC: 21 U/L — SIGNIFICANT CHANGE UP (ref 4–33)
ANION GAP SERPL CALC-SCNC: 11 MMOL/L — SIGNIFICANT CHANGE UP (ref 7–14)
AST SERPL-CCNC: 19 U/L — SIGNIFICANT CHANGE UP (ref 4–32)
BASOPHILS # BLD AUTO: 0.04 K/UL — SIGNIFICANT CHANGE UP (ref 0–0.2)
BASOPHILS NFR BLD AUTO: 0.3 % — SIGNIFICANT CHANGE UP (ref 0–2)
BILIRUB SERPL-MCNC: 0.4 MG/DL — SIGNIFICANT CHANGE UP (ref 0.2–1.2)
BUN SERPL-MCNC: 6 MG/DL — LOW (ref 7–23)
CALCIUM SERPL-MCNC: 9.1 MG/DL — SIGNIFICANT CHANGE UP (ref 8.4–10.5)
CHLORIDE SERPL-SCNC: 103 MMOL/L — SIGNIFICANT CHANGE UP (ref 98–107)
CO2 SERPL-SCNC: 22 MMOL/L — SIGNIFICANT CHANGE UP (ref 22–31)
CREAT SERPL-MCNC: 0.88 MG/DL — SIGNIFICANT CHANGE UP (ref 0.5–1.3)
EGFR: 78 ML/MIN/1.73M2 — SIGNIFICANT CHANGE UP
EOSINOPHIL # BLD AUTO: 0.16 K/UL — SIGNIFICANT CHANGE UP (ref 0–0.5)
EOSINOPHIL NFR BLD AUTO: 1 % — SIGNIFICANT CHANGE UP (ref 0–6)
GLUCOSE SERPL-MCNC: 131 MG/DL — HIGH (ref 70–99)
HCT VFR BLD CALC: 32 % — LOW (ref 34.5–45)
HGB BLD-MCNC: 10.4 G/DL — LOW (ref 11.5–15.5)
IANC: 11.08 K/UL — HIGH (ref 1.8–7.4)
IMM GRANULOCYTES NFR BLD AUTO: 0.9 % — SIGNIFICANT CHANGE UP (ref 0–0.9)
LEGIONELLA AG UR QL: NEGATIVE — SIGNIFICANT CHANGE UP
LYMPHOCYTES # BLD AUTO: 19.7 % — SIGNIFICANT CHANGE UP (ref 13–44)
LYMPHOCYTES # BLD AUTO: 3.1 K/UL — SIGNIFICANT CHANGE UP (ref 1–3.3)
MAGNESIUM SERPL-MCNC: 2.6 MG/DL — SIGNIFICANT CHANGE UP (ref 1.6–2.6)
MCHC RBC-ENTMCNC: 27.1 PG — SIGNIFICANT CHANGE UP (ref 27–34)
MCHC RBC-ENTMCNC: 32.5 GM/DL — SIGNIFICANT CHANGE UP (ref 32–36)
MCV RBC AUTO: 83.3 FL — SIGNIFICANT CHANGE UP (ref 80–100)
MONOCYTES # BLD AUTO: 1.24 K/UL — HIGH (ref 0–0.9)
MONOCYTES NFR BLD AUTO: 7.9 % — SIGNIFICANT CHANGE UP (ref 2–14)
NEUTROPHILS # BLD AUTO: 11.08 K/UL — HIGH (ref 1.8–7.4)
NEUTROPHILS NFR BLD AUTO: 70.2 % — SIGNIFICANT CHANGE UP (ref 43–77)
NRBC # BLD: 0 /100 WBCS — SIGNIFICANT CHANGE UP (ref 0–0)
NRBC # FLD: 0 K/UL — SIGNIFICANT CHANGE UP (ref 0–0)
PHOSPHATE SERPL-MCNC: 2.6 MG/DL — SIGNIFICANT CHANGE UP (ref 2.5–4.5)
PLATELET # BLD AUTO: 323 K/UL — SIGNIFICANT CHANGE UP (ref 150–400)
POTASSIUM SERPL-MCNC: 3.4 MMOL/L — LOW (ref 3.5–5.3)
POTASSIUM SERPL-SCNC: 3.4 MMOL/L — LOW (ref 3.5–5.3)
PROCALCITONIN SERPL-MCNC: 0.61 NG/ML — HIGH (ref 0.02–0.1)
PROT SERPL-MCNC: 6.8 G/DL — SIGNIFICANT CHANGE UP (ref 6–8.3)
RBC # BLD: 3.84 M/UL — SIGNIFICANT CHANGE UP (ref 3.8–5.2)
RBC # FLD: 16.4 % — HIGH (ref 10.3–14.5)
SODIUM SERPL-SCNC: 136 MMOL/L — SIGNIFICANT CHANGE UP (ref 135–145)
WBC # BLD: 15.76 K/UL — HIGH (ref 3.8–10.5)
WBC # FLD AUTO: 15.76 K/UL — HIGH (ref 3.8–10.5)

## 2022-12-07 PROCEDURE — 99254 IP/OBS CNSLTJ NEW/EST MOD 60: CPT | Mod: GC

## 2022-12-07 PROCEDURE — 99233 SBSQ HOSP IP/OBS HIGH 50: CPT

## 2022-12-07 RX ORDER — VANCOMYCIN HCL 1 G
1000 VIAL (EA) INTRAVENOUS EVERY 12 HOURS
Refills: 0 | Status: DISCONTINUED | OUTPATIENT
Start: 2022-12-07 | End: 2022-12-07

## 2022-12-07 RX ORDER — POTASSIUM CHLORIDE 20 MEQ
40 PACKET (EA) ORAL ONCE
Refills: 0 | Status: COMPLETED | OUTPATIENT
Start: 2022-12-07 | End: 2022-12-07

## 2022-12-07 RX ORDER — CEFTRIAXONE 500 MG/1
1000 INJECTION, POWDER, FOR SOLUTION INTRAMUSCULAR; INTRAVENOUS EVERY 24 HOURS
Refills: 0 | Status: DISCONTINUED | OUTPATIENT
Start: 2022-12-07 | End: 2022-12-08

## 2022-12-07 RX ORDER — POTASSIUM CHLORIDE 20 MEQ
20 PACKET (EA) ORAL ONCE
Refills: 0 | Status: COMPLETED | OUTPATIENT
Start: 2022-12-07 | End: 2022-12-07

## 2022-12-07 RX ORDER — POTASSIUM CHLORIDE 20 MEQ
10 PACKET (EA) ORAL
Refills: 0 | Status: DISCONTINUED | OUTPATIENT
Start: 2022-12-07 | End: 2022-12-07

## 2022-12-07 RX ORDER — DOCOSANOL 100 MG/G
1 CREAM TOPICAL ONCE
Refills: 0 | Status: COMPLETED | OUTPATIENT
Start: 2022-12-07 | End: 2022-12-07

## 2022-12-07 RX ADMIN — Medication 250 MILLIGRAM(S): at 18:24

## 2022-12-07 RX ADMIN — PIPERACILLIN AND TAZOBACTAM 25 GRAM(S): 4; .5 INJECTION, POWDER, LYOPHILIZED, FOR SOLUTION INTRAVENOUS at 06:25

## 2022-12-07 RX ADMIN — CEFTRIAXONE 100 MILLIGRAM(S): 500 INJECTION, POWDER, FOR SOLUTION INTRAMUSCULAR; INTRAVENOUS at 20:27

## 2022-12-07 RX ADMIN — PIPERACILLIN AND TAZOBACTAM 25 GRAM(S): 4; .5 INJECTION, POWDER, LYOPHILIZED, FOR SOLUTION INTRAVENOUS at 14:17

## 2022-12-07 RX ADMIN — Medication 20 MILLIEQUIVALENT(S): at 14:17

## 2022-12-07 RX ADMIN — ENOXAPARIN SODIUM 40 MILLIGRAM(S): 100 INJECTION SUBCUTANEOUS at 05:44

## 2022-12-07 RX ADMIN — DOCOSANOL 1 APPLICATION(S): 100 CREAM TOPICAL at 21:18

## 2022-12-07 RX ADMIN — Medication 100 MILLIGRAM(S): at 17:13

## 2022-12-07 RX ADMIN — Medication 40 MILLIEQUIVALENT(S): at 12:38

## 2022-12-07 RX ADMIN — Medication 100 MILLIGRAM(S): at 05:44

## 2022-12-07 RX ADMIN — Medication 250 MILLIGRAM(S): at 04:48

## 2022-12-07 NOTE — PROGRESS NOTE ADULT - PROBLEM SELECTOR PLAN 2
CTA chest: 6 mm right middle lobe pulmonary nodule is noted.  - discussed with patient, needs to f/u with PCP for repeat imaging in 6 weeks

## 2022-12-07 NOTE — CONSULT NOTE ADULT - ATTENDING COMMENTS
52 year old presented with pleuritic chest pain and fever    Imaging suggests RUL pneumonia    Change antibiotics to Ceftriaxone/ doxycyline  Check urine legionella  Check HIV status (pt provided verbal consent)     Will need to repeat chest imaging with pmd in 6 weeks

## 2022-12-07 NOTE — CONSULT NOTE ADULT - SUBJECTIVE AND OBJECTIVE BOX
Patient is a 52y old  Female who presents with a chief complaint of Pneumonia (06 Dec 2022 19:43)    HPI:  51 y/o female with a MHx of HTN presenting with a 1 day history of chest pain and a 1 week history of F/Cs on and off. She describes having recent covid exposure and then starting to feel sick after. She has had some difficulty eating following this with some accompanying nausea. The describes the pain as sharp and at times; The pain is worse when lying down or taking deep breaths. The pain is under her R breast. She mentions passing gas makes it better. She also has a headache for the past day which is not normal for her.  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 She denies weight loss/gain and nightsweats.  (06 Dec 2022 19:43)       REVIEW OF SYSTEMS  Constitutional: No fevers, chills, weight loss or fatigue   Skin: No rash, no phlebitis	  Eyes: No discharge	  ENMT: No sore throat, oral thrush, ulcers or exudate  Respiratory: occasional cough producing minimal sputum, no SOB  Cardiovascular:  No chest pain, palpitations or edema   Gastrointestinal: nausea w/o vomiting, anorexia; no diarrhea or constipation	  Genitourinary: No dysuria, discharge or flank pain  MSK: No arthralgias or back pain   Neurological: No HA, no weakness, no seizures, no AMS       prior hospital charts reviewed [V]  primary team notes reviewed [V]  other consultant notes reviewed [V]    PAST MEDICAL & SURGICAL HISTORY:  HTN (hypertension)      H/O: hysterectomy          SOCIAL HISTORY:  - Denied smoking/vaping/alcohol/recreational drug use    FAMILY HISTORY:  FH: HTN (hypertension) (Mother)    FH: heart disease (Mother)        Allergies  [This allergen will not trigger allergy alert] seasonal allergies (Rhinorrhea)  fragrance (Short breath)  No Known Drug Allergies        ANTIMICROBIALS:  doxycycline IVPB    doxycycline IVPB 100 every 12 hours  piperacillin/tazobactam IVPB.. 3.375 every 8 hours  vancomycin  IVPB 1000 every 12 hours      ANTIMICROBIALS (past 90 days):  MEDICATIONS  (STANDING):    doxycycline IVPB   100 mL/Hr IV Intermittent (22 @ 22:24)    doxycycline IVPB   100 mL/Hr IV Intermittent (22 @ 05:44)    piperacillin/tazobactam IVPB..   25 mL/Hr IV Intermittent (22 @ 06:25)   25 mL/Hr IV Intermittent (22 @ 22:07)    piperacillin/tazobactam IVPB...   200 mL/Hr IV Intermittent (22 @ 12:05)    vancomycin  IVPB   250 mL/Hr IV Intermittent (22 @ 04:48)    vancomycin  IVPB.   250 mL/Hr IV Intermittent (22 @ 13:16)        OTHER MEDS:   MEDICATIONS  (STANDING):  acetaminophen     Tablet .. 650 every 6 hours PRN  enoxaparin Injectable 40 every 24 hours      VITALS:  Vital Signs Last 24 Hrs  T(F): 97.9 (22 @ 05:39), Max: 103 (22 @ 10:47)    Vital Signs Last 24 Hrs  HR: 61 (22 @ 05:39) (60 - 145)  BP: 132/82 (22 @ 05:39) (120/68 - 145/97)  RR: 16 (22 @ 05:39)  SpO2: 100% (22 @ 05:39) (99% - 100%)  Wt(kg): --    EXAM:  General: Patient in no acute distress   HEENT: NCAT, EOMI, PERRL, no oral lesions  CV: S1+S2, no m/r/g appreciated   Lungs: No respiratory distress, CTAB  Abd: Soft, nontender, no guarding, no rebound tenderness, + bowel sounds   Ext: No cyanosis, no edema  Neuro: Alert and oriented, no focal deficits, CN II-XII grossly intact   Skin: No rash   IV: No phlebitis      Labs:                        10.4   15.76 )-----------( 323      ( 07 Dec 2022 05:35 )             32.0         136  |  103  |  6<L>  ----------------------------<  131<H>  3.4<L>   |  22  |  0.88    Ca    9.1      07 Dec 2022 05:35  Phos  2.6       Mg     2.60         TPro  6.8  /  Alb  3.6  /  TBili  0.4  /  DBili  x   /  AST  19  /  ALT  21  /  AlkPhos  101        WBC Trend:  WBC Count: 15.76 (22 @ 05:35)  WBC Count: 19.85 (22 @ 11:45)      Auto Neutrophil #: 11.08 K/uL (22 @ 05:35)  Auto Neutrophil #: 15.92 K/uL (22 @ 11:45)      Creatine Trend:  Creatinine, Serum: 0.88 ()  Creatinine, Serum: 0.92 ()      Liver Biochemical Testing Trend:  Alanine Aminotransferase (ALT/SGPT): 21 ()  Alanine Aminotransferase (ALT/SGPT): 21 ()  Aspartate Aminotransferase (AST/SGOT): 19 (22 @ 05:35)  Aspartate Aminotransferase (AST/SGOT): 19 (22 @ 11:45)  Bilirubin Total, Serum: 0.4 ()  Bilirubin Total, Serum: 0.6 ()      Trend LDH      Auto Eosinophil %: 1.0 % (22 @ 05:35)  Auto Eosinophil %: 0.0 % (22 @ 11:45)      Urinalysis Basic - ( 06 Dec 2022 12:20 )    Color: Light Yellow / Appearance: Clear / S.006 / pH: x  Gluc: x / Ketone: Trace  / Bili: Negative / Urobili: <2 mg/dL   Blood: x / Protein: 30 mg/dL / Nitrite: Negative   Leuk Esterase: Negative / RBC: 1 /HPF / WBC 1 /HPF   Sq Epi: x / Non Sq Epi: 0 /HPF / Bacteria: Negative        MICROBIOLOGY:                                  Procalcitonin, Serum: 0.61 ()        D-Dimer Assay, Quantitative: 333 ()        Troponin T, High Sensitivity Result: 10 ()    Blood Gas Venous - Lactate: 1.4 ( @ 11:45)        RADIOLOGY:  imaging below personally reviewed      ACC: [id number] EXAM:  XR CHEST PA LAT 2V                        PROCEDURE DATE:  2022    INTERPRETATION:  CLINICAL INFORMATION: Fever. Chest pain.    FINDINGS:  There is an opacity in the axillary division of the right upper lobe   consistent with pneumonia. Remainder of the lungs are clear, the heart is   not enlarged and there is no effusion or pneumothorax.    IMPRESSION: Right upper lobe pneumonia.    --- End of Report ---        ACC: [id number] EXAM:  CT ABDOMEN AND PELVIS IC                        ACC: [id number] EXAM:  CT ANGIO CHEST PULM [facility]                        PROCEDURE DATE:  2022    INTERPRETATION:  CLINICAL INFORMATION: Abdominal pleuritic chest pain,   abdominal pain, fever, elevated d-dimer    IMPRESSION:  Rounded right upper lobe opacity, whoseappearance is suggestive of focal   pneumonia. However given its rounded morphology, follow-up to   radiographic resolution is advised to exclude an underlying mass.    No pulmonary embolism.    No acute intra-abdominal or pelvic pathology.    Asymmetric sclerosis of the left iliac bone of uncertain etiology. This   may represent sequelae of early pagetoid changes, or sequelae of fibrous   dysplasia, though nonspecific. Follow-up MRI be obtained as deemed   clinically indicated.    Additional findings and recommendations as above.    --- End of Report ---

## 2022-12-07 NOTE — PROGRESS NOTE ADULT - PROBLEM SELECTOR PLAN 1
yes # Sepsis secondary to RUL pneumonia   # r/o gram negative pneumonia   - CT showed right upper lobe opacity with air bronchograms consistent with pneumonia   - WBC 19.9 on admission, improving with IV antibiotics   - follow up sputum, blood cultures   [ ] Urine legionella   [ ] MRSA PCR  - ID consulted, appreciate reccomendations   - transition to ceftriaxone, doxy

## 2022-12-07 NOTE — CONSULT NOTE ADULT - ASSESSMENT
WORKUP  -Pt meeting SIRS criteria on arrival (T 103, );      -CxR and CT Chest showing consolidation-> suspected source-> meets sepsis criteria.      -UA showing small blood only     -Pt responded very well to 2L bolus, tylenol, and antibiotics (Vanc, Zosyn, and Doxycycline). 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     -However, per patient she never had direct nor prolonged close contact with the COVID+ individual     -Pt reports home test neg and PCR test at this admission negative  -EKG from admission shows prolonged QTc, but notably pt was tachycardic to 139 at this time.      -The standard Bazett QT correction formula may overestimate QTc iso tachycardia (doi [email or web address]/[email or web address])    DIAGNOSIS and IMPRESSION  51yo F pmh HTN p/w chest pain and 1 week fever/chills, found to have sepsis 2/2 lobar pneumonia. Responded well to IVF, tylenol, and empiric abx. Patient has hospital exposure, which raises concern for HAP. Also with minimal comorbidities that would put her at risk of developing pneumonia. Her admission EKG showed sinus tachycardia to 139 with QTc 535, which would be a contraindication to quinilones and macrolides. However, QT correction can be overestimated in tachycardia. Pt now with heart rate in the 60s, so QTc at this time will be more accurate.    RECOMMENDATIONS    Pt to be seen. Preliminary Note.   All recommendations are tentative pending Attending Attestation.    Job Colby MD PhD  PGY-1, Internal medicine  [employer] Teams Preferred  After 5pm/weekends call [phone] WORKUP  -Pt meeting SIRS criteria on arrival (T 103, );      -CxR and CT Chest showing consolidation-> suspected source-> meets sepsis criteria.      -UA showing small blood only     -Pt responded very well to 2L bolus, tylenol, and antibiotics (Vanc, Zosyn, and Doxycycline). 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     -However, per patient she never had direct nor prolonged close contact with the COVID+ individual     -Pt reports home test neg and PCR test at this admission negative  -EKG from admission shows prolonged QTc, but notably pt was tachycardic to 139 at this time.      -The standard Bazett QT correction formula may overestimate QTc iso tachycardia (doi [email or web address]/[email or web address])    DIAGNOSIS and IMPRESSION  -53yo F pmh HTN p/w chest pain and 1 week fever/chills, found to have sepsis 2/2 lobar pneumonia. Responded well to IVF, tylenol, and empiric abx.   -Patient works in a hospital setting, but has not been treated in-hospital, which is the primary risk factor for HAP.  Therefore patient can be treated as community-acquired pneumonia.  -Patient has minimal comorbidities that would put her at risk of developing pneumonia. Further workup should be completed to r/o underlying comorbidities that may increase risk of developing pneumonia.  -Patient admission EKG showed sinus tachycardia to 139 with QTc 535, which would be a contraindication to quinilones and macrolides. However, QT correction can be overestimated in tachycardia. Pt now with heart rate in the 60s, so QTc at this time will be more accurate.    RECOMMENDATIONS  Antibiotics:  -DC Vancomycin and Zosyn  -Transition Doxycycline to PO 100mg q12h  -Start CTX 1g daily    Testing:  -f/u MRSA swab; restart Vanc if positive  -Send Urine legionella and Strep penumo  -Send HIV (patient verbally agreed to testing)  -F/u BCx  -Repeat EKG in morning to assess QTc    Dispo:  -Total antibiotic course of 5 days  -Can transition to PO if BCx clear and afebrile for >48hr  -If QTc wnl and urine leg negative, can use Levaquin when transitioning to PO     -Else, PO cephalosporin + PO Doxy or Azithro  -Outpatient CxR in 6-8 weeks to assess for possible underlying lung pathology        All recommendations are tentative pending Attending Attestation.    Job Colby MD PhD  PGY-1, Internal medicine  [employer] Teams Preferred  After 5pm/weekends call [phone]

## 2022-12-07 NOTE — PROGRESS NOTE ADULT - PROBLEM SELECTOR PLAN 4
CT A/P: Asymmetric sclerosis of the left iliac bone of uncertain etiology. This may represent sequelae of early pagetoid changes, or sequelae of fibrous dysplasia, though nonspecific.  - needs to follow for OP MRI and f/u with PCP

## 2022-12-07 NOTE — PROGRESS NOTE ADULT - PROBLEM SELECTOR PLAN 6
DVT ppx: sq lovenox  Diet: regular lactose free  Dispo: fawad back home, pending clinical improvment  Code: FULL CODE

## 2022-12-08 ENCOUNTER — TRANSCRIPTION ENCOUNTER (OUTPATIENT)
Age: 52
End: 2022-12-08

## 2022-12-08 VITALS
DIASTOLIC BLOOD PRESSURE: 83 MMHG | OXYGEN SATURATION: 99 % | TEMPERATURE: 98 F | RESPIRATION RATE: 17 BRPM | HEART RATE: 64 BPM | SYSTOLIC BLOOD PRESSURE: 141 MMHG

## 2022-12-08 DIAGNOSIS — M79.10 MYALGIA, UNSPECIFIED SITE: ICD-10-CM

## 2022-12-08 LAB
ANION GAP SERPL CALC-SCNC: 11 MMOL/L — SIGNIFICANT CHANGE UP (ref 7–14)
BUN SERPL-MCNC: 6 MG/DL — LOW (ref 7–23)
CALCIUM SERPL-MCNC: 9.4 MG/DL — SIGNIFICANT CHANGE UP (ref 8.4–10.5)
CHLORIDE SERPL-SCNC: 104 MMOL/L — SIGNIFICANT CHANGE UP (ref 98–107)
CO2 SERPL-SCNC: 23 MMOL/L — SIGNIFICANT CHANGE UP (ref 22–31)
CREAT SERPL-MCNC: 0.73 MG/DL — SIGNIFICANT CHANGE UP (ref 0.5–1.3)
CULTURE RESULTS: NO GROWTH — SIGNIFICANT CHANGE UP
EGFR: 98 ML/MIN/1.73M2 — SIGNIFICANT CHANGE UP
GLUCOSE SERPL-MCNC: 101 MG/DL — HIGH (ref 70–99)
HCT VFR BLD CALC: 34 % — LOW (ref 34.5–45)
HGB BLD-MCNC: 11.2 G/DL — LOW (ref 11.5–15.5)
HIV 1+2 AB+HIV1 P24 AG SERPL QL IA: SIGNIFICANT CHANGE UP
MAGNESIUM SERPL-MCNC: 2.2 MG/DL — SIGNIFICANT CHANGE UP (ref 1.6–2.6)
MCHC RBC-ENTMCNC: 27.1 PG — SIGNIFICANT CHANGE UP (ref 27–34)
MCHC RBC-ENTMCNC: 32.9 GM/DL — SIGNIFICANT CHANGE UP (ref 32–36)
MCV RBC AUTO: 82.3 FL — SIGNIFICANT CHANGE UP (ref 80–100)
MRSA PCR RESULT.: SIGNIFICANT CHANGE UP
NRBC # BLD: 0 /100 WBCS — SIGNIFICANT CHANGE UP (ref 0–0)
NRBC # FLD: 0 K/UL — SIGNIFICANT CHANGE UP (ref 0–0)
PHOSPHATE SERPL-MCNC: 2.3 MG/DL — LOW (ref 2.5–4.5)
PLATELET # BLD AUTO: 394 K/UL — SIGNIFICANT CHANGE UP (ref 150–400)
POTASSIUM SERPL-MCNC: 4.2 MMOL/L — SIGNIFICANT CHANGE UP (ref 3.5–5.3)
POTASSIUM SERPL-SCNC: 4.2 MMOL/L — SIGNIFICANT CHANGE UP (ref 3.5–5.3)
RBC # BLD: 4.13 M/UL — SIGNIFICANT CHANGE UP (ref 3.8–5.2)
RBC # FLD: 16.4 % — HIGH (ref 10.3–14.5)
S AUREUS DNA NOSE QL NAA+PROBE: SIGNIFICANT CHANGE UP
S PNEUM AG UR QL: NEGATIVE — SIGNIFICANT CHANGE UP
SODIUM SERPL-SCNC: 138 MMOL/L — SIGNIFICANT CHANGE UP (ref 135–145)
SPECIMEN SOURCE: SIGNIFICANT CHANGE UP
WBC # BLD: 7.76 K/UL — SIGNIFICANT CHANGE UP (ref 3.8–10.5)
WBC # FLD AUTO: 7.76 K/UL — SIGNIFICANT CHANGE UP (ref 3.8–10.5)

## 2022-12-08 PROCEDURE — 99239 HOSP IP/OBS DSCHRG MGMT >30: CPT

## 2022-12-08 PROCEDURE — 99232 SBSQ HOSP IP/OBS MODERATE 35: CPT

## 2022-12-08 RX ORDER — SODIUM,POTASSIUM PHOSPHATES 278-250MG
1 POWDER IN PACKET (EA) ORAL ONCE
Refills: 0 | Status: COMPLETED | OUTPATIENT
Start: 2022-12-08 | End: 2022-12-08

## 2022-12-08 RX ADMIN — Medication 1 PACKET(S): at 11:51

## 2022-12-08 RX ADMIN — ENOXAPARIN SODIUM 40 MILLIGRAM(S): 100 INJECTION SUBCUTANEOUS at 05:37

## 2022-12-08 RX ADMIN — Medication 100 MILLIGRAM(S): at 05:37

## 2022-12-08 NOTE — DISCHARGE NOTE NURSING/CASE MANAGEMENT/SOCIAL WORK - PATIENT PORTAL LINK FT
You can access the FollowMyHealth Patient Portal offered by [facility] by registering at the following website: [email or web address]. By joining [employer]’s FollowMyHealth portal, you will also be able to view your health information using other applications (apps) compatible with our system.

## 2022-12-08 NOTE — DISCHARGE NOTE PROVIDER - ATTENDING DISCHARGE PHYSICAL EXAMINATION:
General: woman sitting up in bed appears well NAD, awake and alert  Eyes: conjunctiva clear, nonicteric sclera  HENMT: NCAT, MMM  Respiratory: No respiratory distress, CTABL  Cardiovascular: S1,S2; Regular rate and rhythm; No m/g/r.   Gastrointestinal: Soft, Nontender, Nondistended; +BS.   Extremities: No c/c/e; warm to touch  Skin: No rashes, No erythema   Psych: appropriate mood and affect

## 2022-12-08 NOTE — DISCHARGE NOTE PROVIDER - CARE PROVIDER_API CALL
Keith Chavis)  Cardiovascular Disease; Internal Medicine  [address], [address]  [place] [address]  Phone: [phone]  Fax: [phone]  Follow Up Time: 1 week

## 2022-12-08 NOTE — PROGRESS NOTE ADULT - SUBJECTIVE AND OBJECTIVE BOX
Follow Up:      Inverval History/ROS:Patient is a 52y old  Female who presents with a chief complaint of Pneumonia (07 Dec 2022 15:46)  No fever  Less cough      Allergies    [This allergen will not trigger allergy alert] seasonal allergies (Rhinorrhea)  fragrance (Short breath)  No Known Drug Allergies    Intolerances        ANTIMICROBIALS:  cefTRIAXone   IVPB 1000 every 24 hours  doxycycline IVPB    doxycycline IVPB 100 every 12 hours      OTHER MEDS:  acetaminophen     Tablet .. 650 milliGRAM(s) Oral every 6 hours PRN  enoxaparin Injectable 40 milliGRAM(s) SubCutaneous every 24 hours  guaiFENesin Oral Liquid (Sugar-Free) 100 milliGRAM(s) Oral every 6 hours PRN  potassium phosphate / sodium phosphate Powder (PHOS-NaK) 1 Packet(s) Oral once      Vital Signs Last 24 Hrs  T(C): 37.1 (08 Dec 2022 05:35), Max: 37.1 (08 Dec 2022 05:35)  T(F): 98.8 (08 Dec 2022 05:35), Max: 98.8 (08 Dec 2022 05:35)  HR: 77 (08 Dec 2022 05:35) (62 - 77)  BP: 131/86 (08 Dec 2022 05:35) (122/78 - 132/85)  BP(mean): --  RR: 16 (08 Dec 2022 05:35) (16 - 18)  SpO2: 100% (08 Dec 2022 05:35) (100% - 100%)    Parameters below as of 08 Dec 2022 05:35  Patient On (Oxygen Delivery Method): room air        PHYSICAL EXAM:  General: [x ] non-toxic  HEAD/EYES: [ ] PERRL [ ]x white sclera [ ] icterus  ENT:  [ ] normal [ ] supple [ ] thrush [ ] pharyngeal exudate  Cardiovascular:   [ ] murmur x[ ] normal [ ] PPM/AICD  Respiratory:  [ x] clear to ausculation bilaterally  GI:  [ x] soft, non-tender, normal bowel sounds  :  [ ] manning [ ]x no CVA tenderness   Musculoskeletal:  [ ] no synovitis  Neurologic:  [ ] non-focal exam   Skin:  [ x] no rash  Lymph: [ x] no lymphadenopathy  Psychiatric:  [x ] appropriate affect [ ] alert & oriented  Lines:  [ x] no phlebitis [ ] central line                                11.2   7.76  )-----------( 394      ( 08 Dec 2022 06:40 )             34.0       12    138  |  104  |  6<L>  ----------------------------<  101<H>  4.2   |  23  |  0.73    Ca    9.4      08 Dec 2022 06:40  Phos  2.3       Mg     2.20         TPro  6.8  /  Alb  3.6  /  TBili  0.4  /  DBili  x   /  AST  19  /  ALT  21  /  AlkPhos  101        Urinalysis Basic - ( 06 Dec 2022 12:20 )    Color: Light Yellow / Appearance: Clear / S.006 / pH: x  Gluc: x / Ketone: Trace  / Bili: Negative / Urobili: <2 mg/dL   Blood: x / Protein: 30 mg/dL / Nitrite: Negative   Leuk Esterase: Negative / RBC: 1 /HPF / WBC 1 /HPF   Sq Epi: x / Non Sq Epi: 0 /HPF / Bacteria: Negative        MICROBIOLOGY:Culture Results:   No growth to date. (22 @ 12:34)  Culture Results:   No growth to date. (22 @ 11:45)      RADIOLOGY:    
Patient is a 52y old  Female who presents with a chief complaint of Pneumonia (07 Dec 2022 10:28)    Manuel Arevalo MD   [facility]   Pager [phone]  Reachable on v[employer] Teams     SUBJECTIVE / OVERNIGHT EVENTS:  Patient seen and examined this morning. She states that she is feeling better today since getting antibiotics  Pain is improved, she states cough is present but now no longer productive  No fevers or chills    MEDICATIONS  (STANDING):  doxycycline IVPB      doxycycline IVPB 100 milliGRAM(s) IV Intermittent every 12 hours  enoxaparin Injectable 40 milliGRAM(s) SubCutaneous every 24 hours  piperacillin/tazobactam IVPB.. 3.375 Gram(s) IV Intermittent every 8 hours  vancomycin  IVPB 1000 milliGRAM(s) IV Intermittent every 12 hours    MEDICATIONS  (PRN):  acetaminophen     Tablet .. 650 milliGRAM(s) Oral every 6 hours PRN Temp greater or equal to 38C (100.4F), Mild Pain (1 - 3), Moderate Pain (4 - 6)      Vital Signs Last 24 Hrs  T(C): 36.7 (07 Dec 2022 14:32), Max: 37.3 (06 Dec 2022 16:21)  T(F): 98.1 (07 Dec 2022 14:32), Max: 99.1 (06 Dec 2022 16:21)  HR: 73 (07 Dec 2022 14:32) (58 - 94)  BP: 122/78 (07 Dec 2022 14:32) (120/68 - 143/70)  BP(mean): --  RR: 17 (07 Dec 2022 14:32) (16 - 19)  SpO2: 100% (07 Dec 2022 14:32) (100% - 100%)  CAPILLARY BLOOD GLUCOSE        I&O's Summary    06 Dec 2022 07:01  -  07 Dec 2022 07:00  --------------------------------------------------------  IN: 450 mL / OUT: 0 mL / NET: 450 mL    07 Dec 2022 07:01  -  07 Dec 2022 15:46  --------------------------------------------------------  IN: 480 mL / OUT: 0 mL / NET: 480 mL        General: woman sitting up in bed appears well NAD, awake and alert  Eyes: conjunctiva clear, nonicteric sclera  HENMT: NCAT, MMM  Respiratory: No respiratory distress, decreased breath sounds on the left base  Cardiovascular: S1,S2; Regular rate and rhythm; No m/g/r.   Gastrointestinal: Soft, Nontender, Nondistended; +BS.   Extremities: No c/c/e; warm to touch  Skin: No rashes, No erythema   Psych: appropriate mood and affect    LABS:                        10.4   15.76 )-----------( 323      ( 07 Dec 2022 05:35 )             32.0     12-    136  |  103  |  6<L>  ----------------------------<  131<H>  3.4<L>   |  22  |  0.88    Ca    9.1      07 Dec 2022 05:35  Phos  2.6     12-  Mg     2.60     12-    TPro  6.8  /  Alb  3.6  /  TBili  0.4  /  DBili  x   /  AST  19  /  ALT  21  /  AlkPhos  101  12-07          Urinalysis Basic - ( 06 Dec 2022 12:20 )    Color: Light Yellow / Appearance: Clear / S.006 / pH: x  Gluc: x / Ketone: Trace  / Bili: Negative / Urobili: <2 mg/dL   Blood: x / Protein: 30 mg/dL / Nitrite: Negative   Leuk Esterase: Negative / RBC: 1 /HPF / WBC 1 /HPF   Sq Epi: x / Non Sq Epi: 0 /HPF / Bacteria: Negative        RADIOLOGY & ADDITIONAL TESTS:    Imaging Personally Reviewed:    Consultant(s) Notes Reviewed:  ID    Care Discussed with Consultants/Other Providers: TRUDY

## 2022-12-08 NOTE — PROGRESS NOTE ADULT - ASSESSMENT
52 year old presented with pleuritic chest pain and fever    Imaging suggests RUL pneumonia  HIV negative    She notes she recently started vaping marijuana  I advised her to stop    Urine legionella is negative- can stop doxycyline    Opacity is rounded/ large- I think a longer course of antibiotics is indicated    Continue antibiotics through 12/20   Continue ceftriaxone while admitted  can finish the course with augmentin 875 q 12 when stable for DC    Will need to repeat chest imaging with pmd in 6 weeks  I would also repeat a CT chest in 3 months    Can follow up with ID as outpt as needed [phone]  Call ID  service with questions
51 y/o female with a MHx of HTN a/w sepsis due to RUL PNA, c/f HAP and found to have pulmonary nodule;

## 2022-12-08 NOTE — DISCHARGE NOTE PROVIDER - HOSPITAL COURSE
53 y/o female with a MHx of HTN a/w sepsis due to RUL PNA, c/f HAP and found to have pulmonary nodule;     Sepsis.   - # Sepsis secondary to RUL pneumonia   # r/o gram negative pneumonia   - CT showed right upper lobe opacity with air bronchograms consistent with pneumonia   - WBC 19.9 on admission, improving with IV antibiotics   - follow up sputum, blood cultures   [ ] Urine legionella   [ ] MRSA PCR  - ID consulted, appreciate recommendations   - transition to ceftriaxone, doxy- DC home on Augmentin      Pulmonary nodule.   - CTA chest: 6 mm right middle lobe pulmonary nodule is noted.  - discussed with patient, needs to f/u with PCP for repeat imaging in 6 weeks.     Prolonged QT interval.    - QTc prolonged to 535ms at time of admission  - repeat EKG under 500 qtc.    Bone lesion.   -CT A/P: Asymmetric sclerosis of the left iliac bone of uncertain etiology. This may represent sequelae of early pagetoid changes, or sequelae of fibrous dysplasia, though nonspecific.  - needs to follow for OP MRI and f/u with PCP.    Hypertension.    - hold home amlodipine and HCTZ for now given septic, restart as tolerated.     Prophylactic measure.   -DVT ppx: sq lovenox  Diet: regular lactose free  Dispo: fawad back home, pending clinical improvement  Code: FULL CODE.    On 12/8/2022 this case was reviewed with Dr. Arevalo , the patient is medically stable and optimized for discharge. All medications were reviewed and prescriptions were sent to mutually agreed upon pharmacy.     53 y/o female with a MHx of HTN a/w sepsis due to RUL PNA and found to have multiple pulmonary nodules. She was started on Ceftriaxone and doxycycline with significant improvement and planned for transition to oral regimen to continue at home.      # Sepsis secondary to RUL pneumonia   - likely secondary to step pneumonia  - CT showed right upper lobe opacity with air bronchograms consistent with pneumonia   - WBC 19.9 on admission, improving with IV antibiotics   - blood cultures with no growth, unable to produce sputum for cultutres   - ID consulted, appreciate recommendations   - s/p ceftriaxone, doxy- DC home on Augmentin for 14 day course      # Pulmonary nodule.   - CTA chest: 6 mm right middle lobe pulmonary nodule is noted.  - discussed with patient, needs to f/u with PCP for repeat imaging in 6 weeks.     # Prolonged QT interval.   - normalized with reduction of heart rate    # Bone lesion.   - CT A/P: Asymmetric sclerosis of the left iliac bone of uncertain etiology. This may represent sequelae of early pagetoid changes, or sequelae of fibrous dysplasia, though nonspecific.  - needs to follow for OP MRI and f/u with PCP.    On 12/8/2022 this case was reviewed with Dr. Arevalo , the patient is medically stable and optimized for discharge. All medications were reviewed and prescriptions were sent to mutually agreed upon pharmacy.

## 2022-12-08 NOTE — DISCHARGE NOTE PROVIDER - NSDCMRMEDTOKEN_GEN_ALL_CORE_FT
amLODIPine 2.5 mg oral tablet: 1 tab(s) orally once a day  amoxicillin-clavulanate 875 mg-125 mg oral tablet: 1 tab(s) orally every 12 hours   hydroCHLOROthiazide 12.5 mg oral tablet: 1 tab(s) orally once a day

## 2022-12-08 NOTE — DISCHARGE NOTE NURSING/CASE MANAGEMENT/SOCIAL WORK - NSDCPEFALRISK_GEN_ALL_CORE
For information on Fall & Injury Prevention, visit: [email or web address] OR  [email or web address] OR  https://www.cdc.gov/steadi/patient.html

## 2022-12-08 NOTE — DISCHARGE NOTE PROVIDER - NSDCCPCAREPLAN_GEN_ALL_CORE_FT
PRINCIPAL DISCHARGE DIAGNOSIS  Diagnosis: Right upper lobe pneumonia  Assessment and Plan of Treatment: You were admitted for pneumonia.  CTA of the chest showed:  Rounded right upper lobe opacity, whoseappearance is suggestive of focal   pneumonia. However given its rounded morphology, follow-up to   radiographic resolution is advised to exclude an underlying mass.  No pulmonary embolism.  No acute intra-abdominal or pelvic pathology.  Asymmetric sclerosis of the left iliac bone of uncertain etiology. This   may represent sequelae of early pagetoid changes, or sequelae of fibrous   dysplasia, though nonspecific. Follow-up MRI be obtained as deemed   clinically indicated.  Additional findings and recommendations as above.  You were seen by Infection Disease team who recommended to be discharge on Augmentin. Take Augmentin until 12/20.  Repeat chest xray in 6 weeks.  Repeat CT of the chest in 3 months.  Follow up your primary care doctor within 1-2 weeks of discharge.         SECONDARY DISCHARGE DIAGNOSES  Diagnosis: Abdominal cramps  Assessment and Plan of Treatment: CT of the abdomen was complete. The results were:  Rounded right upper lobe opacity, whoseappearance is suggestive of focal   pneumonia. However given its rounded morphology, follow-up to   radiographic resolution is advised to exclude an underlying mass.  No pulmonary embolism.  No acute intra-abdominal or pelvic pathology.  Asymmetric sclerosis of the left iliac bone of uncertain etiology. This   may represent sequelae of early pagetoid changes, or sequelae of fibrous   dysplasia, though nonspecific. Follow-up MRI be obtained as deemed   clinically indicated.  Additional findings and recommendations as above.  Follow up with your primary care within 1-2 weeks regarding a obtaining an MRI out patient.    Diagnosis: Myalgia  Assessment and Plan of Treatment:      PRINCIPAL DISCHARGE DIAGNOSIS  Diagnosis: Right upper lobe pneumonia  Assessment and Plan of Treatment: You were admitted for pneumonia.  CTA of the chest showed:  Rounded right upper lobe opacity, whoseappearance is suggestive of focal   pneumonia. However given its rounded morphology, follow-up to   radiographic resolution is advised to exclude an underlying mass.  No pulmonary embolism.  No acute intra-abdominal or pelvic pathology.  Asymmetric sclerosis of the left iliac bone of uncertain etiology. This   may represent sequelae of early pagetoid changes, or sequelae of fibrous   dysplasia, though nonspecific. Follow-up MRI be obtained as deemed   clinically indicated.  Additional findings and recommendations as above.  You were seen by Infection Disease team who recommended to be discharge on Augmentin. Take Augmentin until 12/20.  Repeat chest xray in 6 weeks.  Repeat CT of the chest in 3 months.  Follow up your primary care doctor within 1-2 weeks of discharge. Follow up with infection disease within 1-2 weeks , call [phone] to make an appointment.        SECONDARY DISCHARGE DIAGNOSES  Diagnosis: Abdominal cramps  Assessment and Plan of Treatment: CT of the abdomen was complete. The results were:  Rounded right upper lobe opacity, whoseappearance is suggestive of focal   pneumonia. However given its rounded morphology, follow-up to   radiographic resolution is advised to exclude an underlying mass.  No pulmonary embolism.  No acute intra-abdominal or pelvic pathology.  Asymmetric sclerosis of the left iliac bone of uncertain etiology. This   may represent sequelae of early pagetoid changes, or sequelae of fibrous   dysplasia, though nonspecific. Follow-up MRI be obtained as deemed   clinically indicated.  Additional findings and recommendations as above.  Follow up with your primary care within 1-2 weeks regarding a obtaining an MRI out patient.    Diagnosis: Myalgia  Assessment and Plan of Treatment:

## 2022-12-11 LAB
CULTURE RESULTS: SIGNIFICANT CHANGE UP
CULTURE RESULTS: SIGNIFICANT CHANGE UP
SPECIMEN SOURCE: SIGNIFICANT CHANGE UP
SPECIMEN SOURCE: SIGNIFICANT CHANGE UP

## 2023-03-16 NOTE — MEDICAL STUDENT ADULT H&P (EDUCATION) - NS MD HP STUD RESULTS RAD FT
Lower abd pain worse when she urinates z1 month. Denies hematuria. Feels like she is still wet after she urinates. CTA Chest  RUL opacity  RML nodules  Follow up rec  No PE

## 2023-09-29 NOTE — DISCHARGE NOTE PROVIDER - NSDCQMAMI_CARD_ALL_CORE
Patient tolerated Benlysta well today; no adverse reaction noted.  POC reviewed with pt.  No questions or concerns voiced.  NAD noted upon discharge.  No significant new complaints voiced.   Has f/u appt(s) scheduled per MD request.    
No

## 2024-01-29 ENCOUNTER — NON-APPOINTMENT (OUTPATIENT)
Age: 54
End: 2024-01-29

## 2024-01-30 ENCOUNTER — EMERGENCY (EMERGENCY)
Facility: HOSPITAL | Age: 54
LOS: 0 days | Discharge: HOME | End: 2024-01-30
Attending: STUDENT IN AN ORGANIZED HEALTH CARE EDUCATION/TRAINING PROGRAM
Payer: COMMERCIAL

## 2024-01-30 VITALS
OXYGEN SATURATION: 99 % | SYSTOLIC BLOOD PRESSURE: 130 MMHG | TEMPERATURE: 98 F | DIASTOLIC BLOOD PRESSURE: 76 MMHG | RESPIRATION RATE: 16 BRPM | HEART RATE: 77 BPM

## 2024-01-30 VITALS — HEIGHT: 62 IN | WEIGHT: 145.06 LBS

## 2024-01-30 DIAGNOSIS — R10.13 EPIGASTRIC PAIN: ICD-10-CM

## 2024-01-30 DIAGNOSIS — R07.89 OTHER CHEST PAIN: ICD-10-CM

## 2024-01-30 DIAGNOSIS — I10 ESSENTIAL (PRIMARY) HYPERTENSION: ICD-10-CM

## 2024-01-30 DIAGNOSIS — Z87.891 PERSONAL HISTORY OF NICOTINE DEPENDENCE: ICD-10-CM

## 2024-01-30 DIAGNOSIS — Z90.710 ACQUIRED ABSENCE OF BOTH CERVIX AND UTERUS: Chronic | ICD-10-CM

## 2024-01-30 LAB
ALBUMIN SERPL ELPH-MCNC: 3.6 G/DL — SIGNIFICANT CHANGE UP (ref 3.3–5)
ALP SERPL-CCNC: 171 U/L — HIGH (ref 40–120)
ALT FLD-CCNC: 18 U/L — SIGNIFICANT CHANGE UP (ref 12–78)
ANION GAP SERPL CALC-SCNC: 4 MMOL/L — LOW (ref 5–17)
AST SERPL-CCNC: 14 U/L — LOW (ref 15–37)
BASOPHILS # BLD AUTO: 0.06 K/UL — SIGNIFICANT CHANGE UP (ref 0–0.2)
BASOPHILS NFR BLD AUTO: 0.9 % — SIGNIFICANT CHANGE UP (ref 0–2)
BILIRUB SERPL-MCNC: 0.3 MG/DL — SIGNIFICANT CHANGE UP (ref 0.2–1.2)
BUN SERPL-MCNC: 16 MG/DL — SIGNIFICANT CHANGE UP (ref 7–23)
CALCIUM SERPL-MCNC: 9.2 MG/DL — SIGNIFICANT CHANGE UP (ref 8.5–10.1)
CHLORIDE SERPL-SCNC: 107 MMOL/L — SIGNIFICANT CHANGE UP (ref 96–108)
CO2 SERPL-SCNC: 30 MMOL/L — SIGNIFICANT CHANGE UP (ref 22–31)
CREAT SERPL-MCNC: 0.81 MG/DL — SIGNIFICANT CHANGE UP (ref 0.5–1.3)
D DIMER BLD IA.RAPID-MCNC: <150 NG/ML DDU — SIGNIFICANT CHANGE UP
EGFR: 86 ML/MIN/1.73M2 — SIGNIFICANT CHANGE UP
EOSINOPHIL # BLD AUTO: 0.47 K/UL — SIGNIFICANT CHANGE UP (ref 0–0.5)
EOSINOPHIL NFR BLD AUTO: 7.2 % — HIGH (ref 0–6)
GLUCOSE SERPL-MCNC: 81 MG/DL — SIGNIFICANT CHANGE UP (ref 70–99)
HCT VFR BLD CALC: 39.7 % — SIGNIFICANT CHANGE UP (ref 34.5–45)
HGB BLD-MCNC: 13 G/DL — SIGNIFICANT CHANGE UP (ref 11.5–15.5)
IMM GRANULOCYTES NFR BLD AUTO: 0.2 % — SIGNIFICANT CHANGE UP (ref 0–0.9)
LIDOCAIN IGE QN: 24 U/L — SIGNIFICANT CHANGE UP (ref 13–75)
LYMPHOCYTES # BLD AUTO: 3.44 K/UL — HIGH (ref 1–3.3)
LYMPHOCYTES # BLD AUTO: 52.4 % — HIGH (ref 13–44)
MAGNESIUM SERPL-MCNC: 2.3 MG/DL — SIGNIFICANT CHANGE UP (ref 1.6–2.6)
MCHC RBC-ENTMCNC: 29.2 PG — SIGNIFICANT CHANGE UP (ref 27–34)
MCHC RBC-ENTMCNC: 32.7 G/DL — SIGNIFICANT CHANGE UP (ref 32–36)
MCV RBC AUTO: 89.2 FL — SIGNIFICANT CHANGE UP (ref 80–100)
MONOCYTES # BLD AUTO: 0.64 K/UL — SIGNIFICANT CHANGE UP (ref 0–0.9)
MONOCYTES NFR BLD AUTO: 9.8 % — SIGNIFICANT CHANGE UP (ref 2–14)
NEUTROPHILS # BLD AUTO: 1.94 K/UL — SIGNIFICANT CHANGE UP (ref 1.8–7.4)
NEUTROPHILS NFR BLD AUTO: 29.5 % — LOW (ref 43–77)
NRBC # BLD: 0 /100 WBCS — SIGNIFICANT CHANGE UP (ref 0–0)
NT-PROBNP SERPL-SCNC: 182 PG/ML — HIGH (ref 0–125)
PLATELET # BLD AUTO: 321 K/UL — SIGNIFICANT CHANGE UP (ref 150–400)
POTASSIUM SERPL-MCNC: 4.1 MMOL/L — SIGNIFICANT CHANGE UP (ref 3.5–5.3)
POTASSIUM SERPL-SCNC: 4.1 MMOL/L — SIGNIFICANT CHANGE UP (ref 3.5–5.3)
PROT SERPL-MCNC: 7.2 GM/DL — SIGNIFICANT CHANGE UP (ref 6–8.3)
RBC # BLD: 4.45 M/UL — SIGNIFICANT CHANGE UP (ref 3.8–5.2)
RBC # FLD: 15.1 % — HIGH (ref 10.3–14.5)
SODIUM SERPL-SCNC: 141 MMOL/L — SIGNIFICANT CHANGE UP (ref 135–145)
TROPONIN I, HIGH SENSITIVITY RESULT: 5.4 NG/L — SIGNIFICANT CHANGE UP
WBC # BLD: 6.56 K/UL — SIGNIFICANT CHANGE UP (ref 3.8–10.5)
WBC # FLD AUTO: 6.56 K/UL — SIGNIFICANT CHANGE UP (ref 3.8–10.5)

## 2024-01-30 PROCEDURE — 93010 ELECTROCARDIOGRAM REPORT: CPT

## 2024-01-30 PROCEDURE — 99285 EMERGENCY DEPT VISIT HI MDM: CPT

## 2024-01-30 PROCEDURE — 71046 X-RAY EXAM CHEST 2 VIEWS: CPT | Mod: 26

## 2024-01-30 RX ORDER — FAMOTIDINE 10 MG/ML
20 INJECTION INTRAVENOUS ONCE
Refills: 0 | Status: COMPLETED | OUTPATIENT
Start: 2024-01-30 | End: 2024-01-30

## 2024-01-30 RX ADMIN — FAMOTIDINE 20 MILLIGRAM(S): 10 INJECTION INTRAVENOUS at 20:24

## 2024-01-30 NOTE — ED PROVIDER NOTE - CARE PROVIDERS DIRECT ADDRESSES
[email or web address]

## 2024-01-30 NOTE — ED ADULT NURSE NOTE - NSSUHOSCREENINGYN_ED_ALL_ED
[de-identified] : Exceptionally pleasant though admittedly anxious gentleman presenting to the office today with his wife. They seek General Surgery consultation for a new diagnosis of right inguinal hernia. Mr. Sommers reports a first palpable, then visible fullness in the right groin over the last 2 weeks. Fortunately, he denies any melida discomfort or pain, though he does note some sensation of "awareness" or minor pressure...
Yes - the patient is able to be screened

## 2024-01-30 NOTE — ED PROVIDER NOTE - PROGRESS NOTE DETAILS
Provide visual cue, wrist band, yellow gown, etc./Non-slip footwear when patient is off stretcher/Physically safe environment: no spills, clutter or unnecessary equipment/Instruct patient to call for assistance/Stretcher in lowest position, wheels locked, appropriate side rails in place Martines DO: pt informed of nonspecific alk phos elevation - outpt cardiology follow up and return precautions advised, pt anxious for discharge

## 2024-01-30 NOTE — ED ADULT TRIAGE NOTE - NS ED NURSE BANDS TYPE
Name band; Complex Repair And Tissue Cultured Epidermal Autograft Text: The defect edges were debeveled with a #15 scalpel blade.  The primary defect was closed partially with a complex linear closure.  Given the location of the defect, shape of the defect and the proximity to free margins an tissue cultured epidermal autograft was deemed most appropriate to repair the remaining defect.  The graft was trimmed to fit the size of the remaining defect.  The graft was then placed in the primary defect, oriented appropriately, and sutured into place.

## 2024-01-30 NOTE — ED PROVIDER NOTE - PATIENT PORTAL LINK FT
You can access the FollowMyHealth Patient Portal offered by [facility] by registering at the following website: [email or web address]. By joining [employer]’s FollowMyHealth portal, you will also be able to view your health information using other applications (apps) compatible with our system.

## 2024-01-30 NOTE — ED PROVIDER NOTE - PHYSICAL EXAMINATION
Gen: aox3, nad,   Head: NCAT  ENT: Airway patent, moist mucous membranes, nasal passageways clear   Cardiac: Normal rate, normal rhythm, no murmurs   Respiratory: Lungs CTA B/L  Gastrointestinal: Abdomen soft, nontender, nondistended, no rebound, no guarding  MSK: No gross abnormalities, FROM of all four extremities, no edema  HEME: Extremities warm and well perfused   Skin: No rashes, no lesions  Neuro: No gross neurologic deficits

## 2024-01-30 NOTE — ED ADULT TRIAGE NOTE - CHIEF COMPLAINT QUOTE
Pt AAO x 3 ambulatory with steady gait c/o having midsternal chest pain started on Saturday, feels it on movement and breathing denies HA N/V dizziness or SOB pt reports lifting heavy bag on Friday night and that she has been non compliant with her HTN meds x 1 month due to being on vacation. EKG done

## 2024-01-30 NOTE — ED PROVIDER NOTE - CLINICAL SUMMARY MEDICAL DECISION MAKING FREE TEXT BOX
53F pmhx HTN who presents with lower chest pain/ epigastric pain worse with movement such as bending and worse with deep inspiration, duration of symptoms ongoing x 4 days - pt states initial attributed symptoms to 'gas pains'. States she conducted some heavy lifting day prior to symptom onset - and is not sure if that contributes. Symptoms are not worse with exertion. Denies SOB or radiation of pain to back or abd. Denies numbness/ weakness  - pain epigastric/ lower chest area, possible GERD, rule out ACS with troponin/ ekg, due to age will ddimer rule out PE , consider cxr if ddimer negative - possible muscle strain/ costochondritis, trial pepcid

## 2024-01-30 NOTE — ED ADULT NURSE NOTE - OBJECTIVE STATEMENT
Pt is AOx4 c/o chest pain. Pt began feeling midsternal chest pain on Saturday, reports lifting heavy bag on Friday night. Pt feels pain on movement, exertion, with deep breaths. Denies N/V/SOB/HA/dizziness. Pt speaking complete sentences, breathing equal and unlabored. Hx HTN, pt noncompliant with meds x1 month d/t being on vacation

## 2024-01-30 NOTE — ED PROVIDER NOTE - OBJECTIVE STATEMENT
53F pmhx HTN who presents with lower chest pain/ epigastric pain worse with movement such as bending and worse with deep inspiration, duration of symptoms ongoing x 4 days - pt 53F pmhx HTN who presents with lower chest pain/ epigastric pain worse with movement such as bending and worse with deep inspiration, duration of symptoms ongoing x 4 days - pt states initial attributed symptoms to 'gas pains'. States she conducted some heavy lifting day prior to symptom onset - and is not sure if that contributes. Symptoms are not worse with exertion. Denies SOB or radiation of pain to back or abd. Denies numbness/ weakness

## 2024-01-30 NOTE — ED PROVIDER NOTE - CARE PROVIDER_API CALL
[facility]  [address] [address] [place] [address], [address]  [place] [address]  Phone: [phone]  Fax: [phone]  Follow Up Time:

## 2024-01-30 NOTE — ED PROVIDER NOTE - NSFOLLOWUPINSTRUCTIONS_ED_ALL_ED_FT
You were seen today for chest pain, we recommend follow up with primary care physician and cardiologist - we checked a heart enzyme and ddimer today which was normal.     Chest Pain    Chest pain can be caused by many different conditions which may or may not be dangerous. Causes include heartburn, lung infections, heart attack, blood clot in lungs, skin infections, strain or damage to muscle, cartilage, or bones, etc. In addition to a history and physical examination, an electrocardiogram (ECG) or other lab tests may have been performed to determine the cause of your chest pain. Follow up with your primary care provider or with a cardiologist as instructed.     SEEK IMMEDIATE MEDICAL CARE IF YOU HAVE ANY OF THE FOLLOWING SYMPTOMS: worsening chest pain, coughing up blood, unexplained back/neck/jaw pain, severe abdominal pain, dizziness or lightheadedness, fainting, shortness of breath, sweaty or clammy skin, vomiting, or racing heart beat. These symptoms may represent a serious problem that is an emergency. Do not wait to see if the symptoms will go away. Get medical help right away. Call 911 and do not drive yourself to the hospital.

## 2024-02-01 PROBLEM — I10 ESSENTIAL (PRIMARY) HYPERTENSION: Chronic | Status: ACTIVE | Noted: 2022-12-06

## 2024-08-05 NOTE — PATIENT PROFILE ADULT - FUNCTIONAL ASSESSMENT - BASIC MOBILITY 1.
[de-identified] : Patient does keep the hand in a guarded position.  She has tenderness to palpation on the flexor carpi radialis.  Well-healed surgical skin incision.  Global wrist tenderness.  4 = No assist / stand by assistance

## 2025-01-10 ENCOUNTER — NON-APPOINTMENT (OUTPATIENT)
Age: 55
End: 2025-01-10